# Patient Record
Sex: FEMALE | Race: WHITE | NOT HISPANIC OR LATINO
[De-identification: names, ages, dates, MRNs, and addresses within clinical notes are randomized per-mention and may not be internally consistent; named-entity substitution may affect disease eponyms.]

---

## 2019-02-21 ENCOUNTER — RECORD ABSTRACTING (OUTPATIENT)
Age: 74
End: 2019-02-21

## 2019-02-21 DIAGNOSIS — Z80.0 FAMILY HISTORY OF MALIGNANT NEOPLASM OF DIGESTIVE ORGANS: ICD-10-CM

## 2019-02-21 DIAGNOSIS — Z85.3 PERSONAL HISTORY OF MALIGNANT NEOPLASM OF BREAST: ICD-10-CM

## 2019-02-21 DIAGNOSIS — Z86.69 PERSONAL HISTORY OF OTHER DISEASES OF THE NERVOUS SYSTEM AND SENSE ORGANS: ICD-10-CM

## 2019-02-22 ENCOUNTER — APPOINTMENT (OUTPATIENT)
Dept: INTERNAL MEDICINE | Facility: CLINIC | Age: 74
End: 2019-02-22
Payer: MEDICARE

## 2019-02-22 VITALS
OXYGEN SATURATION: 98 % | SYSTOLIC BLOOD PRESSURE: 126 MMHG | DIASTOLIC BLOOD PRESSURE: 80 MMHG | HEART RATE: 83 BPM | WEIGHT: 150 LBS | TEMPERATURE: 98.2 F | HEIGHT: 66 IN | RESPIRATION RATE: 16 BRPM | BODY MASS INDEX: 24.11 KG/M2

## 2019-02-22 DIAGNOSIS — Z82.49 FAMILY HISTORY OF ISCHEMIC HEART DISEASE AND OTHER DISEASES OF THE CIRCULATORY SYSTEM: ICD-10-CM

## 2019-02-22 DIAGNOSIS — R30.0 DYSURIA: ICD-10-CM

## 2019-02-22 DIAGNOSIS — Z80.8 FAMILY HISTORY OF MALIGNANT NEOPLASM OF OTHER ORGANS OR SYSTEMS: ICD-10-CM

## 2019-02-22 PROCEDURE — 99214 OFFICE O/P EST MOD 30 MIN: CPT

## 2019-02-22 NOTE — PLAN
[FreeTextEntry1] : Neurology- multiple sclerosis- continue with Lyrica one tablet 3 times a day. Advised to follow with neurologist.  Continue all medications. \par \par Rheumatology- osteoporosis-  patient is currently taking Prolia injection every 6 months.  Advised to have a good social calcium daily. Decrease caffeine intake. Will monitor bone density scans.\par \par Orthopedic- acute left hip pain.  Patient is able to bear full weight and walk.  Send patient for an x-ray of the left hip and pelvis.  Start meloxicam 7.5 mg daily. Discontinue Tylenol. Followup with orthopedic.\par \par History of urinary tract infections-  patient currently taking cranberry tabs twice a day which seems to help

## 2019-02-22 NOTE — HEALTH RISK ASSESSMENT
[0] : 2) Feeling down, depressed, or hopeless: Not at all (0) [] : No [de-identified] : regular [de-identified] : multiple falls no injuries

## 2019-02-22 NOTE — PAST MEDICAL HISTORY
[Postmenopausal] : history of menopause having occurred [Menopause Age____] : age at menopause was [unfilled] [Total Preg ___] : G: [unfilled] [Live Births___] : P: [unfilled] [Full Term ___] : Full Term: [unfilled]

## 2019-02-22 NOTE — COUNSELING
[Fall prevention counseling provided] : fall prevention  [Adequate lighting] : Adequate lighting [No throw rugs] : No throw rugs [Use proper foot wear] : Use proper foot wear [Use recommended devices] : Use recommended devices [Patient motivation] : Patient motivation [Good understanding] : Patient has a good understanding of lifestyle changes and the steps needed to achieve self management goals

## 2019-02-22 NOTE — REVIEW OF SYSTEMS
[Negative] : Heme/Lymph [Back Pain] : back pain [FreeTextEntry9] : left hip pain   [de-identified] : black and blue spot left hip

## 2019-02-22 NOTE — HISTORY OF PRESENT ILLNESS
[Musculoskeletal Symptoms Legs] : leg [Musculoskeletal Symptoms Hips] : hip [___ Weeks ago] : [unfilled] weeks ago [Constant] : constant [Moderate] : moderate [OTC Remedies] : OTC remedies [Worsening] : worsening [FreeTextEntry7] : left hip [FreeTextEntry2] : pain goes down the leg [FreeTextEntry5] : Tylenol, [FreeTextEntry4] : sitting [FreeTextEntry8] : Patient states she is taking Tylenol 5 times a day without any relief.\par The patient denies trauma, or falling. But states the other day she saw her a black and blue spot on the hip , which is no longer there\par Patient denies pain with walking.\par \par Patient states she needs a renewal of her Lyrica. Denies any side effects of medication. Patient states the medication is not helping her left hip/leg pain.\par \par Patient denies using a walker or cane as directed mant times in the past

## 2019-03-25 ENCOUNTER — RX RENEWAL (OUTPATIENT)
Age: 74
End: 2019-03-25

## 2019-04-12 ENCOUNTER — RX RENEWAL (OUTPATIENT)
Age: 74
End: 2019-04-12

## 2019-04-16 ENCOUNTER — APPOINTMENT (OUTPATIENT)
Dept: INTERNAL MEDICINE | Facility: CLINIC | Age: 74
End: 2019-04-16

## 2019-04-23 ENCOUNTER — RX RENEWAL (OUTPATIENT)
Age: 74
End: 2019-04-23

## 2019-04-30 ENCOUNTER — APPOINTMENT (OUTPATIENT)
Dept: INTERNAL MEDICINE | Facility: CLINIC | Age: 74
End: 2019-04-30
Payer: MEDICARE

## 2019-04-30 VITALS
BODY MASS INDEX: 24.75 KG/M2 | HEIGHT: 66 IN | WEIGHT: 154 LBS | SYSTOLIC BLOOD PRESSURE: 88 MMHG | HEART RATE: 76 BPM | DIASTOLIC BLOOD PRESSURE: 66 MMHG | TEMPERATURE: 98.4 F | OXYGEN SATURATION: 97 % | RESPIRATION RATE: 16 BRPM

## 2019-04-30 PROCEDURE — 36415 COLL VENOUS BLD VENIPUNCTURE: CPT

## 2019-04-30 PROCEDURE — 99213 OFFICE O/P EST LOW 20 MIN: CPT | Mod: 25

## 2019-04-30 NOTE — HISTORY OF PRESENT ILLNESS
[FreeTextEntry1] : non-fasting blood work and general follow-up\par  [de-identified] : Patient is a 73 year year old female with a past medical history as below who presents for non-fasting blood work and general follow-up. Patient states she is taking all medications as prescribed and denies any adverse reactions or side effects. Patient inquires about measles vaccine.

## 2019-04-30 NOTE — ADDENDUM
[FreeTextEntry1] : I, Mark Euceda, acted solely as scribe for Dr. Emerson Smith DO on this date 04/30/2019  3:40PM .\par \par All medical record entries made by the Scribe were at my, Dr. Emerson Smith DO direction and personally dictated by me on 04/30/2019  3:40PM. I have reviewed the chart and agree that the record accurately reflects my personal performance of the history, physical exam, assessment and plan. I have also personally directed, reviewed and agreed with the chart.\par

## 2019-04-30 NOTE — PLAN
[FreeTextEntry1] : Musculoskeletal \par osteoarthritis-continue Meloxicam 7.5mg p.o.q.d. with food\par osteoporosis-continue Prolia 60mg/mL subcutaneous injections every 6 months\par continue Lyrica 75mg TID p.o.q.d.\par \par check immunization panel \par \par \par \par

## 2019-05-06 LAB
HBV SURFACE AB SER QL: NONREACTIVE
HEPATITIS A IGG ANTIBODY: NONREACTIVE
MEV IGG FLD QL IA: >300 AU/ML
MEV IGG+IGM SER-IMP: POSITIVE
MEV IGM SER QL: NEGATIVE
MUV IGM SER QL IA: <0.8 AU
RUBV IGG FLD-ACNC: >33 INDEX
RUBV IGG SER-IMP: POSITIVE
RUBV IGM FLD-ACNC: <20 AU/ML
VZV AB TITR SER: POSITIVE
VZV IGG SER IF-ACNC: 1284 INDEX
VZV IGM SER IF-ACNC: <0.91 INDEX

## 2019-05-22 ENCOUNTER — RX RENEWAL (OUTPATIENT)
Age: 74
End: 2019-05-22

## 2019-05-23 ENCOUNTER — RX RENEWAL (OUTPATIENT)
Age: 74
End: 2019-05-23

## 2019-05-28 ENCOUNTER — APPOINTMENT (OUTPATIENT)
Dept: INTERNAL MEDICINE | Facility: CLINIC | Age: 74
End: 2019-05-28
Payer: MEDICARE

## 2019-05-28 VITALS
WEIGHT: 153 LBS | TEMPERATURE: 98.4 F | BODY MASS INDEX: 24.59 KG/M2 | HEIGHT: 66 IN | RESPIRATION RATE: 14 BRPM | OXYGEN SATURATION: 98 % | HEART RATE: 77 BPM

## 2019-05-28 PROCEDURE — 99213 OFFICE O/P EST LOW 20 MIN: CPT

## 2019-05-28 NOTE — ADDENDUM
[FreeTextEntry1] : I, Mark Euceda, acted solely as scribe for Dr. Emerson Smith DO on this date 05/28/2019  4:20PM .\par \par All medical record entries made by the Scribe were at my, Dr. Emerson Smith DO direction and personally dictated by me on 05/28/2019  4:20PM. I have reviewed the chart and agree that the record accurately reflects my personal performance of the history, physical exam, assessment and plan. I have also personally directed, reviewed and agreed with the chart.\par

## 2019-05-28 NOTE — REVIEW OF SYSTEMS
[Joint Pain] : joint pain [Back Pain] : back pain [Negative] : Heme/Lymph [FreeTextEntry9] : neck pain

## 2019-05-28 NOTE — ASSESSMENT
[FreeTextEntry1] : Patient is a 73 year year old female with a past medical history as above who presents for Rx refill and general follow-up.\par

## 2019-05-28 NOTE — PHYSICAL EXAM
[Well Nourished] : well nourished [No Acute Distress] : no acute distress [Normal Sclera/Conjunctiva] : normal sclera/conjunctiva [Well-Appearing] : well-appearing [Well Developed] : well developed [PERRL] : pupils equal round and reactive to light [EOMI] : extraocular movements intact [No JVD] : no jugular venous distention [Normal Oropharynx] : the oropharynx was normal [Normal Outer Ear/Nose] : the outer ears and nose were normal in appearance [Supple] : supple [No Lymphadenopathy] : no lymphadenopathy [No Respiratory Distress] : no respiratory distress  [Thyroid Normal, No Nodules] : the thyroid was normal and there were no nodules present [Clear to Auscultation] : lungs were clear to auscultation bilaterally [No Accessory Muscle Use] : no accessory muscle use [Normal Rate] : normal rate  [Regular Rhythm] : with a regular rhythm [Normal S1, S2] : normal S1 and S2 [No Murmur] : no murmur heard [No Carotid Bruits] : no carotid bruits [No Abdominal Bruit] : a ~M bruit was not heard ~T in the abdomen [No Varicosities] : no varicosities [Pedal Pulses Present] : the pedal pulses are present [No Edema] : there was no peripheral edema [No Extremity Clubbing/Cyanosis] : no extremity clubbing/cyanosis [No Palpable Aorta] : no palpable aorta [Soft] : abdomen soft [Non Tender] : non-tender [Non-distended] : non-distended [No Masses] : no abdominal mass palpated [No HSM] : no HSM [Normal Bowel Sounds] : normal bowel sounds [Normal Anterior Cervical Nodes] : no anterior cervical lymphadenopathy [Normal Posterior Cervical Nodes] : no posterior cervical lymphadenopathy [No CVA Tenderness] : no CVA  tenderness [No Joint Swelling] : no joint swelling [No Spinal Tenderness] : no spinal tenderness [Grossly Normal Strength/Tone] : grossly normal strength/tone [No Rash] : no rash [Coordination Grossly Intact] : coordination grossly intact [Normal Gait] : normal gait [Deep Tendon Reflexes (DTR)] : deep tendon reflexes were 2+ and symmetric [Normal Affect] : the affect was normal [No Focal Deficits] : no focal deficits [Normal Insight/Judgement] : insight and judgment were intact

## 2019-05-28 NOTE — PLAN
[FreeTextEntry1] : Musculoskeletal \par osteoarthritis-continue Meloxicam 7.5mg p.o.q.d. with food\par osteoporosis-continue Prolia 60mg/mL subcutaneous injections every 6 months\par chronic back/neck pain - continue Lyrica 75mg TID p.o.q.d., Rx filled\par continue Oxycodone-Acetaminophhen 10-325mg QID p.o.q.d. as needed for pain \par \par \par \par \par

## 2019-05-28 NOTE — HISTORY OF PRESENT ILLNESS
[de-identified] : Patient is a 73 year year old female with a past medical history as below who presents for Rx refill and general follow-up. Patient states she is taking all medications as prescribed and denies any adverse reactions or side effects. Patient requests Rx refill for Lyrica given history of chronic back/neck pain secondary to spinal stenosis.  [FreeTextEntry1] : Rx refill and general follow-up\par

## 2019-06-28 ENCOUNTER — APPOINTMENT (OUTPATIENT)
Dept: INTERNAL MEDICINE | Facility: CLINIC | Age: 74
End: 2019-06-28

## 2019-07-15 ENCOUNTER — APPOINTMENT (OUTPATIENT)
Dept: INTERNAL MEDICINE | Facility: CLINIC | Age: 74
End: 2019-07-15
Payer: MEDICARE

## 2019-07-15 VITALS
HEIGHT: 68 IN | HEART RATE: 88 BPM | BODY MASS INDEX: 23.72 KG/M2 | TEMPERATURE: 98.6 F | SYSTOLIC BLOOD PRESSURE: 110 MMHG | DIASTOLIC BLOOD PRESSURE: 70 MMHG | RESPIRATION RATE: 18 BRPM | WEIGHT: 156.5 LBS | OXYGEN SATURATION: 97 %

## 2019-07-15 PROCEDURE — 99213 OFFICE O/P EST LOW 20 MIN: CPT

## 2019-07-16 NOTE — PLAN
[FreeTextEntry1] : Musculoskeletal \par osteoarthritis - continue Meloxicam 7.5mg p.o.q.d. with food\par osteoporosis - continue Prolia 60mg/mL subcutaneous injections every 6 months\par chronic back/neck pain - continue Lyrica 75mg TID p.o.q.d, Rx filled; continue Oxycodone-Acetaminophen 10-325mg QID p.o. p.r.n. for pain \par \par \par \par \par

## 2019-07-16 NOTE — ADDENDUM
[FreeTextEntry1] : I, Mark Euceda, acted solely as scribe for Dr. Emerson Smith DO on this date 07/15/2019 10:00AM .\par \par All medical record entries made by the Scribe were at my, Dr. Emerson Smith DO direction and personally dictated by me on 07/15/2019 10:00AM. I have reviewed the chart and agree that the record accurately reflects my personal performance of the history, physical exam, assessment and plan. I have also personally directed, reviewed and agreed with the chart.\par

## 2019-07-16 NOTE — HISTORY OF PRESENT ILLNESS
[FreeTextEntry1] : Rx refill and general follow-up\par  [de-identified] : Patient is a 73 year year old female with a past medical history as below who presents for Rx refill and general follow-up. Patient states she is taking all medications as prescribed and denies any adverse reactions or side effects. Patient requests Rx refill for Lyrica given history of chronic back/neck pain secondary to spinal stenosis.

## 2019-08-22 ENCOUNTER — APPOINTMENT (OUTPATIENT)
Dept: INTERNAL MEDICINE | Facility: CLINIC | Age: 74
End: 2019-08-22
Payer: MEDICARE

## 2019-08-22 VITALS
WEIGHT: 158.25 LBS | RESPIRATION RATE: 16 BRPM | HEIGHT: 68 IN | DIASTOLIC BLOOD PRESSURE: 64 MMHG | SYSTOLIC BLOOD PRESSURE: 92 MMHG | BODY MASS INDEX: 23.98 KG/M2 | HEART RATE: 94 BPM | TEMPERATURE: 98.2 F | OXYGEN SATURATION: 96 %

## 2019-08-22 PROCEDURE — 99213 OFFICE O/P EST LOW 20 MIN: CPT | Mod: 25

## 2019-08-22 PROCEDURE — 90670 PCV13 VACCINE IM: CPT

## 2019-08-22 PROCEDURE — G0009: CPT

## 2019-08-22 NOTE — PHYSICAL EXAM
[No Acute Distress] : no acute distress [Well Nourished] : well nourished [Well-Appearing] : well-appearing [Well Developed] : well developed [Normal Sclera/Conjunctiva] : normal sclera/conjunctiva [PERRL] : pupils equal round and reactive to light [EOMI] : extraocular movements intact [Normal Outer Ear/Nose] : the outer ears and nose were normal in appearance [Normal Oropharynx] : the oropharynx was normal [No JVD] : no jugular venous distention [No Lymphadenopathy] : no lymphadenopathy [Supple] : supple [Thyroid Normal, No Nodules] : the thyroid was normal and there were no nodules present [No Respiratory Distress] : no respiratory distress  [No Accessory Muscle Use] : no accessory muscle use [Clear to Auscultation] : lungs were clear to auscultation bilaterally [Normal Rate] : normal rate  [Regular Rhythm] : with a regular rhythm [Normal S1, S2] : normal S1 and S2 [No Murmur] : no murmur heard [No Carotid Bruits] : no carotid bruits [No Abdominal Bruit] : a ~M bruit was not heard ~T in the abdomen [No Varicosities] : no varicosities [Pedal Pulses Present] : the pedal pulses are present [No Edema] : there was no peripheral edema [No Palpable Aorta] : no palpable aorta [No Extremity Clubbing/Cyanosis] : no extremity clubbing/cyanosis [Soft] : abdomen soft [Non Tender] : non-tender [Non-distended] : non-distended [No Masses] : no abdominal mass palpated [No HSM] : no HSM [Normal Bowel Sounds] : normal bowel sounds [Normal Posterior Cervical Nodes] : no posterior cervical lymphadenopathy [Normal Anterior Cervical Nodes] : no anterior cervical lymphadenopathy [No CVA Tenderness] : no CVA  tenderness [No Spinal Tenderness] : no spinal tenderness [No Joint Swelling] : no joint swelling [Grossly Normal Strength/Tone] : grossly normal strength/tone [No Rash] : no rash [Coordination Grossly Intact] : coordination grossly intact [No Focal Deficits] : no focal deficits [Normal Gait] : normal gait [Deep Tendon Reflexes (DTR)] : deep tendon reflexes were 2+ and symmetric [Normal Affect] : the affect was normal [Normal Insight/Judgement] : insight and judgment were intact

## 2019-08-23 ENCOUNTER — RX RENEWAL (OUTPATIENT)
Age: 74
End: 2019-08-23

## 2019-08-23 NOTE — ADDENDUM
[FreeTextEntry1] : I, Mark Euceda, acted solely as scribe for Dr. Emerson Smith DO on this date 08/22/2019 10:50AM .\par \par All medical record entries made by the Scribe were at my, Dr. Emerson Smith DO direction and personally dictated by me on 08/22/2019 10:50AM. I have reviewed the chart and agree that the record accurately reflects my personal performance of the history, physical exam, assessment and plan. I have also personally directed, reviewed and agreed with the chart.\par

## 2019-08-23 NOTE — PLAN
[FreeTextEntry1] : Infectious Disease\par Prevnar 13 - 0.5mL x 1 administered intramuscularly \par Musculoskeletal \par osteoporosis - continue Prolia 60mg/mL subcutaneous injections every 6 months\par chronic back/neck pain - secondary to spinal stenosis - continue Lyrica 75mg TID p.o.q.d

## 2019-08-23 NOTE — HISTORY OF PRESENT ILLNESS
[FreeTextEntry1] : Prevnar 13  [de-identified] : Patient is a 74 year old female with a past medical history as below who presents for Prevnar 13. Patient states she is taking all medications as prescribed and denies any adverse reactions or side effects. Patient is currently taking Lyrica for chronic back/neck pain secondary to spinal stenosis. She inquires about the date of her next Prolia injection.

## 2019-08-26 ENCOUNTER — RX RENEWAL (OUTPATIENT)
Age: 74
End: 2019-08-26

## 2019-09-05 ENCOUNTER — APPOINTMENT (OUTPATIENT)
Dept: INTERNAL MEDICINE | Facility: CLINIC | Age: 74
End: 2019-09-05
Payer: MEDICARE

## 2019-09-05 VITALS
SYSTOLIC BLOOD PRESSURE: 98 MMHG | HEIGHT: 72 IN | RESPIRATION RATE: 16 BRPM | TEMPERATURE: 97.7 F | OXYGEN SATURATION: 96 % | HEART RATE: 86 BPM | BODY MASS INDEX: 20.99 KG/M2 | WEIGHT: 155 LBS | DIASTOLIC BLOOD PRESSURE: 68 MMHG

## 2019-09-05 DIAGNOSIS — Z86.69 PERSONAL HISTORY OF OTHER DISEASES OF THE NERVOUS SYSTEM AND SENSE ORGANS: ICD-10-CM

## 2019-09-05 PROCEDURE — 36415 COLL VENOUS BLD VENIPUNCTURE: CPT

## 2019-09-05 PROCEDURE — 99213 OFFICE O/P EST LOW 20 MIN: CPT | Mod: 25

## 2019-09-05 NOTE — REVIEW OF SYSTEMS
[Back Pain] : back pain [Negative] : Heme/Lymph [FreeTextEntry9] : left hip pain   [de-identified] : black and blue spot left hip

## 2019-09-05 NOTE — PLAN
[FreeTextEntry1] : Neurology- multiple sclerosis-Lumbar stenosis  continue with Lyrica one tablet 3 times a day. Advised to follow with neurologist.  Continue all medications.  Checked \par \par Rheumatology- osteoporosis-  patient is currently taking Prolia injection every 6 months.  Advised to have a good social calcium daily. Decrease caffeine intake. Will monitor bone density scans. Check labs if normal will give prolia injection next week \par \par History of urinary tract infections-  patient currently taking cranberry tabs twice a day which seems to help.\par \par Immunization advised to follow up in 10/19 for the flu shot.  \par refused Tdap today

## 2019-09-05 NOTE — PHYSICAL EXAM
[No Acute Distress] : no acute distress [Well Nourished] : well nourished [Well Developed] : well developed [Well-Appearing] : well-appearing [Normal Sclera/Conjunctiva] : normal sclera/conjunctiva [PERRL] : pupils equal round and reactive to light [EOMI] : extraocular movements intact [Normal Outer Ear/Nose] : the outer ears and nose were normal in appearance [Normal Oropharynx] : the oropharynx was normal [No JVD] : no jugular venous distention [Supple] : supple [No Lymphadenopathy] : no lymphadenopathy [Thyroid Normal, No Nodules] : the thyroid was normal and there were no nodules present [No Respiratory Distress] : no respiratory distress  [Clear to Auscultation] : lungs were clear to auscultation bilaterally [No Accessory Muscle Use] : no accessory muscle use [Regular Rhythm] : with a regular rhythm [Normal Rate] : normal rate  [Normal S1, S2] : normal S1 and S2 [No Murmur] : no murmur heard [No Carotid Bruits] : no carotid bruits [No Abdominal Bruit] : a ~M bruit was not heard ~T in the abdomen [Pedal Pulses Present] : the pedal pulses are present [No Varicosities] : no varicosities [No Edema] : there was no peripheral edema [No Palpable Aorta] : no palpable aorta [No Extremity Clubbing/Cyanosis] : no extremity clubbing/cyanosis [Non Tender] : non-tender [Soft] : abdomen soft [Non-distended] : non-distended [No Masses] : no abdominal mass palpated [No HSM] : no HSM [Normal Bowel Sounds] : normal bowel sounds [Normal Posterior Cervical Nodes] : no posterior cervical lymphadenopathy [Normal Anterior Cervical Nodes] : no anterior cervical lymphadenopathy [No CVA Tenderness] : no CVA  tenderness [No Spinal Tenderness] : no spinal tenderness [No Joint Swelling] : no joint swelling [Grossly Normal Strength/Tone] : grossly normal strength/tone [No Rash] : no rash [Normal Gait] : normal gait [Coordination Grossly Intact] : coordination grossly intact [No Focal Deficits] : no focal deficits [Deep Tendon Reflexes (DTR)] : deep tendon reflexes were 2+ and symmetric [Normal Affect] : the affect was normal [Normal Insight/Judgement] : insight and judgment were intact

## 2019-09-05 NOTE — HEALTH RISK ASSESSMENT
[0] : 2) Feeling down, depressed, or hopeless: Not at all (0) [Never (0 pts)] : Never (0 points) [No] : In the past 12 months have you used drugs other than those required for medical reasons? No [No falls in past year] : Patient reported no falls in the past year [Patient reported bone density results were abnormal] : Patient reported bone density results were abnormal [With Significant Other] : lives with significant other [# of Members in Household ___] :  household currently consist of [unfilled] member(s) [Employed] : employed [] :  [Fully functional (bathing, dressing, toileting, transferring, walking, feeding)] : Fully functional (bathing, dressing, toileting, transferring, walking, feeding) [Fully functional (using the telephone, shopping, preparing meals, housekeeping, doing laundry, using] : Fully functional and needs no help or supervision to perform IADLs (using the telephone, shopping, preparing meals, housekeeping, doing laundry, using transportation, managing medications and managing finances) [Reports changes in hearing] : Reports changes in hearing [] : No [de-identified] : regular [de-identified] : rxn lyrica  [Audit-CScore] : 0 [OGO9Ocbrt] : 0 [de-identified] : multiple falls no injuries  [BoneDensityDate] : 01/19 [BoneDensityComments] : osteoporosis  [de-identified] : hearing aids

## 2019-09-05 NOTE — ASSESSMENT
[FreeTextEntry1] : A 74-year-old female, who presents to office for follow up of her multiple sclerosis and osteoporosis

## 2019-09-05 NOTE — COUNSELING
[Use recommended devices] : Use recommended devices [Patient motivation] : Patient motivation [Good understanding] : Patient has a good understanding of lifestyle changes and the steps needed to achieve self management goals [Fall prevention counseling provided] : Fall prevention counseling provided [Adequate lighting] : Adequate lighting [No throw rugs] : No throw rugs [Use proper foot wear] : Use proper foot wear [AUDIT-C Screening administered and reviewed] : AUDIT-C Screening administered and reviewed

## 2019-09-05 NOTE — HISTORY OF PRESENT ILLNESS
[FreeTextEntry1] : check up and Medication renewal  [de-identified] : A 74-year-old female, with a past medical history as listed below, presents to the office for a checkup and renewal of medication. Patient states she's been taking Lyrica her for her multiple sclerosis/neuropathy symptoms which seems to be helping control her pain and numbness.  Ms. Lagunas denies any side effects to the current medication regimen.  Patient denies any recent falls. Also states she has not fallen down over the past year.\par \corby Also states that her Prolia shot is ready at [facility]. States she is to  the medication to bring to the office.\par \corby Recently saw her a new ophthalmologist who diagnosed her with allergic conjunctivitis and placed on antihistamine drops.  \par \par

## 2019-09-06 LAB
25(OH)D3 SERPL-MCNC: 32.3 NG/ML
ALBUMIN SERPL ELPH-MCNC: 4.6 G/DL
ALP BLD-CCNC: 62 U/L
ALT SERPL-CCNC: 12 U/L
ANION GAP SERPL CALC-SCNC: 12 MMOL/L
AST SERPL-CCNC: 14 U/L
BASOPHILS # BLD AUTO: 0.03 K/UL
BASOPHILS NFR BLD AUTO: 0.4 %
BILIRUB SERPL-MCNC: 0.3 MG/DL
BUN SERPL-MCNC: 20 MG/DL
CALCIUM SERPL-MCNC: 10.5 MG/DL
CHLORIDE SERPL-SCNC: 102 MMOL/L
CO2 SERPL-SCNC: 26 MMOL/L
CREAT SERPL-MCNC: 0.86 MG/DL
EOSINOPHIL # BLD AUTO: 0.17 K/UL
EOSINOPHIL NFR BLD AUTO: 2.2 %
GLUCOSE SERPL-MCNC: 101 MG/DL
HCT VFR BLD CALC: 45.3 %
HGB BLD-MCNC: 14.4 G/DL
IMM GRANULOCYTES NFR BLD AUTO: 0.4 %
LYMPHOCYTES # BLD AUTO: 1.77 K/UL
LYMPHOCYTES NFR BLD AUTO: 22.6 %
MAN DIFF?: NORMAL
MCHC RBC-ENTMCNC: 29.3 PG
MCHC RBC-ENTMCNC: 31.8 GM/DL
MCV RBC AUTO: 92.3 FL
MONOCYTES # BLD AUTO: 0.68 K/UL
MONOCYTES NFR BLD AUTO: 8.7 %
NEUTROPHILS # BLD AUTO: 5.16 K/UL
NEUTROPHILS NFR BLD AUTO: 65.7 %
PLATELET # BLD AUTO: 243 K/UL
POTASSIUM SERPL-SCNC: 5.2 MMOL/L
PROT SERPL-MCNC: 7.4 G/DL
RBC # BLD: 4.91 M/UL
RBC # FLD: 13.6 %
SODIUM SERPL-SCNC: 140 MMOL/L
WBC # FLD AUTO: 7.84 K/UL

## 2019-09-09 ENCOUNTER — APPOINTMENT (OUTPATIENT)
Dept: INTERNAL MEDICINE | Facility: CLINIC | Age: 74
End: 2019-09-09

## 2019-09-13 ENCOUNTER — MED ADMIN CHARGE (OUTPATIENT)
Age: 74
End: 2019-09-13

## 2019-09-13 ENCOUNTER — APPOINTMENT (OUTPATIENT)
Dept: INTERNAL MEDICINE | Facility: CLINIC | Age: 74
End: 2019-09-13
Payer: MEDICARE

## 2019-09-13 VITALS
HEART RATE: 85 BPM | HEIGHT: 66 IN | OXYGEN SATURATION: 98 % | RESPIRATION RATE: 16 BRPM | SYSTOLIC BLOOD PRESSURE: 100 MMHG | DIASTOLIC BLOOD PRESSURE: 72 MMHG | TEMPERATURE: 98.4 F

## 2019-09-13 PROCEDURE — 96372 THER/PROPH/DIAG INJ SC/IM: CPT | Mod: 59

## 2019-09-13 PROCEDURE — 86580 TB INTRADERMAL TEST: CPT

## 2019-09-13 PROCEDURE — 99214 OFFICE O/P EST MOD 30 MIN: CPT | Mod: 25

## 2019-09-13 NOTE — PLAN
[FreeTextEntry1] : Neurology- multiple sclerosis-Lumbar stenosis  continue with Lyrica one tablet 3 times a day. Advised to follow with neurologist.  Continue all medications.  Checked \par \par Rheumatology- osteoporosis- Ca level normal. Prolia given sub Q left upper ext.  Lot [id number]   exp 1021.  Advised to have a good source of  calcium daily. Decrease caffeine intake. Will monitor bone density scans. \par History of urinary tract infections-  patient currently taking cranberry tabs twice a day which seems to help.\par \par Immunization advised to follow up in 10/19 for the flu shot.  \par refused Tdap today \par PPD check in 48- 72 hours\par See medical form for work

## 2019-09-13 NOTE — PAST MEDICAL HISTORY
[Postmenopausal] : history of menopause having occurred [Menopause Age____] : age at menopause was [unfilled] [Live Births___] : P: [unfilled] [Total Preg ___] : G: [unfilled] [Full Term ___] : Full Term: [unfilled]

## 2019-09-13 NOTE — REVIEW OF SYSTEMS
[Back Pain] : back pain [Negative] : Heme/Lymph [FreeTextEntry9] : left hip pain   [de-identified] : black and blue spot left hip

## 2019-09-13 NOTE — HEALTH RISK ASSESSMENT
[Never (0 pts)] : Never (0 points) [No] : In the past 12 months have you used drugs other than those required for medical reasons? No [No falls in past year] : Patient reported no falls in the past year [0] : 2) Feeling down, depressed, or hopeless: Not at all (0) [Patient reported bone density results were abnormal] : Patient reported bone density results were abnormal [With Significant Other] : lives with significant other [Employed] : employed [# of Members in Household ___] :  household currently consist of [unfilled] member(s) [] :  [Fully functional (bathing, dressing, toileting, transferring, walking, feeding)] : Fully functional (bathing, dressing, toileting, transferring, walking, feeding) [Fully functional (using the telephone, shopping, preparing meals, housekeeping, doing laundry, using] : Fully functional and needs no help or supervision to perform IADLs (using the telephone, shopping, preparing meals, housekeeping, doing laundry, using transportation, managing medications and managing finances) [Reports changes in hearing] : Reports changes in hearing [] : No [de-identified] : rxn lyrica  [Audit-CScore] : 0 [de-identified] : regular [de-identified] : multiple falls no injuries  [ESB5Yifmw] : 0 [BoneDensityComments] : osteoporosis  [BoneDensityDate] : 01/19 [de-identified] : hearing aids

## 2019-09-13 NOTE — PHYSICAL EXAM
[No Acute Distress] : no acute distress [Well Nourished] : well nourished [Well-Appearing] : well-appearing [Well Developed] : well developed [Normal Sclera/Conjunctiva] : normal sclera/conjunctiva [PERRL] : pupils equal round and reactive to light [EOMI] : extraocular movements intact [Normal Outer Ear/Nose] : the outer ears and nose were normal in appearance [Normal Oropharynx] : the oropharynx was normal [No JVD] : no jugular venous distention [Supple] : supple [No Lymphadenopathy] : no lymphadenopathy [Thyroid Normal, No Nodules] : the thyroid was normal and there were no nodules present [No Respiratory Distress] : no respiratory distress  [Clear to Auscultation] : lungs were clear to auscultation bilaterally [No Accessory Muscle Use] : no accessory muscle use [Regular Rhythm] : with a regular rhythm [Normal Rate] : normal rate  [Normal S1, S2] : normal S1 and S2 [No Abdominal Bruit] : a ~M bruit was not heard ~T in the abdomen [No Carotid Bruits] : no carotid bruits [Pedal Pulses Present] : the pedal pulses are present [No Edema] : there was no peripheral edema [No Extremity Clubbing/Cyanosis] : no extremity clubbing/cyanosis [No Palpable Aorta] : no palpable aorta [Soft] : abdomen soft [Non Tender] : non-tender [Non-distended] : non-distended [No Masses] : no abdominal mass palpated [No HSM] : no HSM [Normal Bowel Sounds] : normal bowel sounds [Normal Posterior Cervical Nodes] : no posterior cervical lymphadenopathy [Normal Anterior Cervical Nodes] : no anterior cervical lymphadenopathy [No CVA Tenderness] : no CVA  tenderness [No Spinal Tenderness] : no spinal tenderness [No Joint Swelling] : no joint swelling [Grossly Normal Strength/Tone] : grossly normal strength/tone [No Rash] : no rash [No Focal Deficits] : no focal deficits [Deep Tendon Reflexes (DTR)] : deep tendon reflexes were 2+ and symmetric [Normal Affect] : the affect was normal [Normal Insight/Judgement] : insight and judgment were intact [Alert and Oriented x3] : oriented to person, place, and time [Speech Grossly Normal] : speech grossly normal [Normal Mood] : the mood was normal [de-identified] : with murmur  [de-identified] : DJD changes hand  [de-identified] : gait unbalanced secondary to MS

## 2019-09-13 NOTE — COUNSELING
[AUDIT-C Screening administered and reviewed] : AUDIT-C Screening administered and reviewed [Fall prevention counseling provided] : fall prevention  [Adequate lighting] : Adequate lighting [No throw rugs] : No throw rugs [Use proper foot wear] : Use proper foot wear [Use recommended devices] : Use recommended devices [Patient motivation] : Patient motivation [Good understanding] : Patient has a good understanding of lifestyle changes and the steps needed to achieve self management goals

## 2019-09-13 NOTE — HISTORY OF PRESENT ILLNESS
[FreeTextEntry1] : Prolia shot and work clearance form  [de-identified] : A 74-year-old female, with a past medical history as noted below, presents to office for a checkup, Prolia injection and evaluation to work at a [employer].  Patient states she needs a form completed stating she is free of any communicable diseases. Patient denies having past  medical history of TB or exposure to tuberculosis.  Patient denies having fever chills night sweats, cough or hemoptysis

## 2019-10-02 ENCOUNTER — APPOINTMENT (OUTPATIENT)
Dept: INTERNAL MEDICINE | Facility: CLINIC | Age: 74
End: 2019-10-02
Payer: MEDICARE

## 2019-10-02 ENCOUNTER — MED ADMIN CHARGE (OUTPATIENT)
Age: 74
End: 2019-10-02

## 2019-10-02 VITALS
WEIGHT: 150 LBS | SYSTOLIC BLOOD PRESSURE: 102 MMHG | HEIGHT: 66 IN | RESPIRATION RATE: 16 BRPM | OXYGEN SATURATION: 98 % | HEART RATE: 97 BPM | DIASTOLIC BLOOD PRESSURE: 68 MMHG | BODY MASS INDEX: 24.11 KG/M2

## 2019-10-02 PROCEDURE — 90662 IIV NO PRSV INCREASED AG IM: CPT

## 2019-10-02 PROCEDURE — G0008: CPT

## 2019-10-02 PROCEDURE — 99213 OFFICE O/P EST LOW 20 MIN: CPT | Mod: 25

## 2019-10-02 NOTE — HISTORY OF PRESENT ILLNESS
[FreeTextEntry1] : Check up and flu shot  [de-identified] : A 74- year- old female, with a past medical history as noted below, presents to office for a checkup and a flu shot.  States she feels well.  Denies recent infections, SOB, MORALES, nausea or vomiting.  \par Would like to get a refill on Lyrica as well asa flu shot \par Denies recent falls

## 2019-10-02 NOTE — HEALTH RISK ASSESSMENT
[Never (0 pts)] : Never (0 points) [No] : In the past 12 months have you used drugs other than those required for medical reasons? No [No falls in past year] : Patient reported no falls in the past year [0] : 2) Feeling down, depressed, or hopeless: Not at all (0) [Patient reported bone density results were abnormal] : Patient reported bone density results were abnormal [With Significant Other] : lives with significant other [# of Members in Household ___] :  household currently consist of [unfilled] member(s) [Employed] : employed [] :  [Fully functional (bathing, dressing, toileting, transferring, walking, feeding)] : Fully functional (bathing, dressing, toileting, transferring, walking, feeding) [Fully functional (using the telephone, shopping, preparing meals, housekeeping, doing laundry, using] : Fully functional and needs no help or supervision to perform IADLs (using the telephone, shopping, preparing meals, housekeeping, doing laundry, using transportation, managing medications and managing finances) [Reports changes in hearing] : Reports changes in hearing [] : No [Audit-CScore] : 0 [de-identified] : rxn lyrica  [de-identified] : multiple falls no injuries  [de-identified] : regular [VNR1Ruema] : 0 [BoneDensityDate] : 01/19 [BoneDensityComments] : osteoporosis  [de-identified] : hearing aids

## 2019-10-02 NOTE — REVIEW OF SYSTEMS
[Back Pain] : back pain [Negative] : Psychiatric [FreeTextEntry9] : left hip pain   [de-identified] : black and blue spot left hip

## 2019-10-02 NOTE — PAST MEDICAL HISTORY
[Postmenopausal] : history of menopause having occurred [Menopause Age____] : age at menopause was [unfilled] [Total Preg ___] : G: [unfilled] [Full Term ___] : Full Term: [unfilled]  [Live Births___] : P: [unfilled]

## 2019-10-02 NOTE — PHYSICAL EXAM
[No Acute Distress] : no acute distress [Well Nourished] : well nourished [Well Developed] : well developed [Well-Appearing] : well-appearing [Normal Sclera/Conjunctiva] : normal sclera/conjunctiva [PERRL] : pupils equal round and reactive to light [EOMI] : extraocular movements intact [Normal Outer Ear/Nose] : the outer ears and nose were normal in appearance [No JVD] : no jugular venous distention [Normal Oropharynx] : the oropharynx was normal [Supple] : supple [No Lymphadenopathy] : no lymphadenopathy [Thyroid Normal, No Nodules] : the thyroid was normal and there were no nodules present [No Respiratory Distress] : no respiratory distress  [Clear to Auscultation] : lungs were clear to auscultation bilaterally [No Accessory Muscle Use] : no accessory muscle use [Normal Rate] : normal rate  [Regular Rhythm] : with a regular rhythm [Normal S1, S2] : normal S1 and S2 [No Carotid Bruits] : no carotid bruits [No Abdominal Bruit] : a ~M bruit was not heard ~T in the abdomen [Pedal Pulses Present] : the pedal pulses are present [No Edema] : there was no peripheral edema [No Extremity Clubbing/Cyanosis] : no extremity clubbing/cyanosis [No Palpable Aorta] : no palpable aorta [Soft] : abdomen soft [Non Tender] : non-tender [Non-distended] : non-distended [No Masses] : no abdominal mass palpated [No HSM] : no HSM [Normal Bowel Sounds] : normal bowel sounds [Normal Posterior Cervical Nodes] : no posterior cervical lymphadenopathy [Normal Anterior Cervical Nodes] : no anterior cervical lymphadenopathy [No CVA Tenderness] : no CVA  tenderness [No Spinal Tenderness] : no spinal tenderness [No Joint Swelling] : no joint swelling [Grossly Normal Strength/Tone] : grossly normal strength/tone [No Rash] : no rash [No Focal Deficits] : no focal deficits [Deep Tendon Reflexes (DTR)] : deep tendon reflexes were 2+ and symmetric [Speech Grossly Normal] : speech grossly normal [Normal Affect] : the affect was normal [Alert and Oriented x3] : oriented to person, place, and time [Normal Mood] : the mood was normal [Normal Insight/Judgement] : insight and judgment were intact [de-identified] : with murmur  [de-identified] : DJD changes hand  [de-identified] : gait unbalanced secondary to MS

## 2019-11-12 ENCOUNTER — RX RENEWAL (OUTPATIENT)
Age: 74
End: 2019-11-12

## 2019-11-13 ENCOUNTER — APPOINTMENT (OUTPATIENT)
Dept: INTERNAL MEDICINE | Facility: CLINIC | Age: 74
End: 2019-11-13
Payer: MEDICARE

## 2019-11-13 VITALS
HEART RATE: 98 BPM | HEIGHT: 66 IN | BODY MASS INDEX: 24.11 KG/M2 | OXYGEN SATURATION: 97 % | TEMPERATURE: 98.3 F | RESPIRATION RATE: 18 BRPM | DIASTOLIC BLOOD PRESSURE: 82 MMHG | WEIGHT: 150 LBS | SYSTOLIC BLOOD PRESSURE: 110 MMHG

## 2019-11-13 PROCEDURE — 99213 OFFICE O/P EST LOW 20 MIN: CPT

## 2019-11-13 NOTE — HISTORY OF PRESENT ILLNESS
[FreeTextEntry1] : Check up and  renewal of medication  [de-identified] : A 74- year- old female, with a past medical history as noted below, presents to office for a checkup  and renewal of medication.  States she feels well.  Denies recent infections, SOB, MORALES, nausea or vomiting.  \par  \par Denies recent falls.\par Unaware of last Tdap shot

## 2019-11-13 NOTE — PHYSICAL EXAM
[No Acute Distress] : no acute distress [Well-Appearing] : well-appearing [Well Nourished] : well nourished [Well Developed] : well developed [Normal Sclera/Conjunctiva] : normal sclera/conjunctiva [EOMI] : extraocular movements intact [PERRL] : pupils equal round and reactive to light [No JVD] : no jugular venous distention [Normal Outer Ear/Nose] : the outer ears and nose were normal in appearance [Normal Oropharynx] : the oropharynx was normal [No Lymphadenopathy] : no lymphadenopathy [Supple] : supple [Thyroid Normal, No Nodules] : the thyroid was normal and there were no nodules present [No Accessory Muscle Use] : no accessory muscle use [Clear to Auscultation] : lungs were clear to auscultation bilaterally [No Respiratory Distress] : no respiratory distress  [Regular Rhythm] : with a regular rhythm [Normal Rate] : normal rate  [No Abdominal Bruit] : a ~M bruit was not heard ~T in the abdomen [No Carotid Bruits] : no carotid bruits [Normal S1, S2] : normal S1 and S2 [No Edema] : there was no peripheral edema [Pedal Pulses Present] : the pedal pulses are present [No Extremity Clubbing/Cyanosis] : no extremity clubbing/cyanosis [No Palpable Aorta] : no palpable aorta [Non Tender] : non-tender [Soft] : abdomen soft [Non-distended] : non-distended [No Masses] : no abdominal mass palpated [No HSM] : no HSM [Normal Bowel Sounds] : normal bowel sounds [Normal Posterior Cervical Nodes] : no posterior cervical lymphadenopathy [No CVA Tenderness] : no CVA  tenderness [No Spinal Tenderness] : no spinal tenderness [Normal Anterior Cervical Nodes] : no anterior cervical lymphadenopathy [No Joint Swelling] : no joint swelling [No Rash] : no rash [Grossly Normal Strength/Tone] : grossly normal strength/tone [Speech Grossly Normal] : speech grossly normal [No Focal Deficits] : no focal deficits [Deep Tendon Reflexes (DTR)] : deep tendon reflexes were 2+ and symmetric [Alert and Oriented x3] : oriented to person, place, and time [Normal Affect] : the affect was normal [Normal Mood] : the mood was normal [Normal Insight/Judgement] : insight and judgment were intact [de-identified] : DJD changes hand  [de-identified] : with murmur  [de-identified] : gait unbalanced secondary to MS

## 2019-11-13 NOTE — PAST MEDICAL HISTORY
[Postmenopausal] : history of menopause having occurred [Total Preg ___] : G: [unfilled] [Menopause Age____] : age at menopause was [unfilled] [Full Term ___] : Full Term: [unfilled]  [Live Births___] : P: [unfilled]

## 2019-11-13 NOTE — COUNSELING
[AUDIT-C Screening administered and reviewed] : AUDIT-C Screening administered and reviewed [Fall prevention counseling provided] : fall prevention  [Adequate lighting] : Adequate lighting [No throw rugs] : No throw rugs [Use proper foot wear] : Use proper foot wear [Use recommended devices] : Use recommended devices [Patient motivation] : Patient motivation [de-identified] : Advised importance of using a cane  [Good understanding] : Patient has a good understanding of disease, goals and obesity follow-up plan

## 2019-11-13 NOTE — HEALTH RISK ASSESSMENT
[No] : In the past 12 months have you used drugs other than those required for medical reasons? No [Never (0 pts)] : Never (0 points) [No falls in past year] : Patient reported no falls in the past year [0] : 2) Feeling down, depressed, or hopeless: Not at all (0) [Patient reported bone density results were abnormal] : Patient reported bone density results were abnormal [With Significant Other] : lives with significant other [# of Members in Household ___] :  household currently consist of [unfilled] member(s) [Fully functional (bathing, dressing, toileting, transferring, walking, feeding)] : Fully functional (bathing, dressing, toileting, transferring, walking, feeding) [Employed] : employed [] :  [Fully functional (using the telephone, shopping, preparing meals, housekeeping, doing laundry, using] : Fully functional and needs no help or supervision to perform IADLs (using the telephone, shopping, preparing meals, housekeeping, doing laundry, using transportation, managing medications and managing finances) [Reports changes in hearing] : Reports changes in hearing [] : No [Audit-CScore] : 0 [de-identified] : regular [de-identified] : multiple falls no injuries  [de-identified] : rxn lyrica  [[id number]] : 0 [BoneDensityDate] : 01/19 [de-identified] : hearing aids  [BoneDensityComments] : osteoporosis

## 2019-11-13 NOTE — ASSESSMENT
[FreeTextEntry1] : A 74- year-old female, who presents to office for follow up of her multiple sclerosis and osteoporosis

## 2019-11-13 NOTE — PLAN
[FreeTextEntry1] : Neurology- multiple sclerosis-Lumbar stenosis  continue with Lyrica one tablet 3 times a day. Advised to follow with neurologist.  Continue all medications.  Checked  see scan.  Advised side effects to the medication \par \par Rheumatology- osteoporosis-   Advised to have a good source of  calcium daily. Decrease caffeine intake. Will monitor bone density scans.  Prolia injection was done 9/19 \par  \par Immunization - Advised to call medicare - about Tdap \par \par \par We discussed the importance of healthy lifestyles which include exercise, weight control and good diet.\par Patient's questions were answered in full detail. Advise patient if any other concerns arise to please call office to have a discussion.

## 2019-12-10 ENCOUNTER — APPOINTMENT (OUTPATIENT)
Dept: INTERNAL MEDICINE | Facility: CLINIC | Age: 74
End: 2019-12-10
Payer: MEDICARE

## 2019-12-10 VITALS
HEIGHT: 66 IN | OXYGEN SATURATION: 97 % | DIASTOLIC BLOOD PRESSURE: 70 MMHG | HEART RATE: 78 BPM | TEMPERATURE: 98.3 F | SYSTOLIC BLOOD PRESSURE: 102 MMHG | RESPIRATION RATE: 16 BRPM

## 2019-12-10 PROCEDURE — 99213 OFFICE O/P EST LOW 20 MIN: CPT

## 2019-12-10 NOTE — PHYSICAL EXAM
[No Acute Distress] : no acute distress [Well-Appearing] : well-appearing [Well Nourished] : well nourished [Well Developed] : well developed [PERRL] : pupils equal round and reactive to light [Normal Sclera/Conjunctiva] : normal sclera/conjunctiva [EOMI] : extraocular movements intact [Normal Outer Ear/Nose] : the outer ears and nose were normal in appearance [Normal Oropharynx] : the oropharynx was normal [No JVD] : no jugular venous distention [Supple] : supple [No Lymphadenopathy] : no lymphadenopathy [No Respiratory Distress] : no respiratory distress  [No Accessory Muscle Use] : no accessory muscle use [Thyroid Normal, No Nodules] : the thyroid was normal and there were no nodules present [Normal Rate] : normal rate  [Clear to Auscultation] : lungs were clear to auscultation bilaterally [Regular Rhythm] : with a regular rhythm [Normal S1, S2] : normal S1 and S2 [No Murmur] : no murmur heard [No Varicosities] : no varicosities [No Abdominal Bruit] : a ~M bruit was not heard ~T in the abdomen [No Carotid Bruits] : no carotid bruits [Pedal Pulses Present] : the pedal pulses are present [No Edema] : there was no peripheral edema [No Palpable Aorta] : no palpable aorta [Non Tender] : non-tender [No Extremity Clubbing/Cyanosis] : no extremity clubbing/cyanosis [Soft] : abdomen soft [No Masses] : no abdominal mass palpated [No HSM] : no HSM [Non-distended] : non-distended [Normal Anterior Cervical Nodes] : no anterior cervical lymphadenopathy [Normal Posterior Cervical Nodes] : no posterior cervical lymphadenopathy [Normal Bowel Sounds] : normal bowel sounds [No CVA Tenderness] : no CVA  tenderness [No Spinal Tenderness] : no spinal tenderness [Grossly Normal Strength/Tone] : grossly normal strength/tone [No Joint Swelling] : no joint swelling [No Rash] : no rash [No Focal Deficits] : no focal deficits [Coordination Grossly Intact] : coordination grossly intact [Normal Gait] : normal gait [Deep Tendon Reflexes (DTR)] : deep tendon reflexes were 2+ and symmetric [Normal Insight/Judgement] : insight and judgment were intact [Normal Affect] : the affect was normal

## 2019-12-11 NOTE — PLAN
[FreeTextEntry1] : Musculoskeletal \par osteoporosis - continue Prolia 60mg/mL subcutaneous injections every 6 months\par chronic back/neck pain - secondary to spinal stenosis - continue Lyrica (Pregabalin) 75mg TID p.o.q.d., Rx filled,  reviewed  \par Psychiatry\par continue Venlafaxine HCl 25mg TID p.o.q.d. as directed \par Urology\par chronic UTIs - continue Methenamine Hippurate 1 GM p.o.q.d. as directed and OTC Cranberry 405mg BID p.o.q.d.\par incontinence/overactive bladder - continue Trospium Chloride ER 60mg p.o.q.d.\par

## 2019-12-11 NOTE — HISTORY OF PRESENT ILLNESS
[Spouse] : spouse [FreeTextEntry1] : Rx refill and general follow-up [de-identified] : Patient is a 74 year old female with a past medical history as below who presents for Rx refill and general follow-up. Patient states she is taking all medications as prescribed and denies any adverse reactions or side effects. She notes less frequent UTIs since starting Methenamine Hippurate and OTC Cranberry capsules. Patient requests Rx refill for Lyrica which she states is helping with chronic back and neck pain.

## 2019-12-11 NOTE — ADDENDUM
[FreeTextEntry1] : I, Mark Euceda, acted solely as scribe for Dr. Emerson Smith DO on this date 12/10/2019  3:20PM .\par \par All medical record entries made by the Scribe were at my, Dr. Emerson Smith DO direction and personally dictated by me on 12/10/2019  3:20PM. I have reviewed the chart and agree that the record accurately reflects my personal performance of the history, physical exam, assessment and plan. I have also personally directed, reviewed and agreed with the chart.\par

## 2019-12-11 NOTE — ASSESSMENT
[FreeTextEntry1] : Patient is a 74 year old female with a past medical history as above who presents for Rx refill and general follow-up.

## 2020-01-06 ENCOUNTER — APPOINTMENT (OUTPATIENT)
Dept: INTERNAL MEDICINE | Facility: CLINIC | Age: 75
End: 2020-01-06

## 2020-01-13 ENCOUNTER — APPOINTMENT (OUTPATIENT)
Dept: INTERNAL MEDICINE | Facility: CLINIC | Age: 75
End: 2020-01-13
Payer: MEDICARE

## 2020-01-13 VITALS
RESPIRATION RATE: 16 BRPM | OXYGEN SATURATION: 96 % | HEIGHT: 66 IN | BODY MASS INDEX: 24.11 KG/M2 | DIASTOLIC BLOOD PRESSURE: 68 MMHG | SYSTOLIC BLOOD PRESSURE: 120 MMHG | WEIGHT: 150 LBS | HEART RATE: 83 BPM | TEMPERATURE: 97.6 F

## 2020-01-13 PROCEDURE — 99213 OFFICE O/P EST LOW 20 MIN: CPT

## 2020-01-13 NOTE — PHYSICAL EXAM
[No Acute Distress] : no acute distress [Well Nourished] : well nourished [Well Developed] : well developed [Well-Appearing] : well-appearing [Normal Sclera/Conjunctiva] : normal sclera/conjunctiva [PERRL] : pupils equal round and reactive to light [EOMI] : extraocular movements intact [Normal Outer Ear/Nose] : the outer ears and nose were normal in appearance [Normal Oropharynx] : the oropharynx was normal [No JVD] : no jugular venous distention [No Lymphadenopathy] : no lymphadenopathy [Supple] : supple [Thyroid Normal, No Nodules] : the thyroid was normal and there were no nodules present [No Respiratory Distress] : no respiratory distress  [No Accessory Muscle Use] : no accessory muscle use [Clear to Auscultation] : lungs were clear to auscultation bilaterally [Normal Rate] : normal rate  [No Murmur] : no murmur heard [Normal S1, S2] : normal S1 and S2 [Regular Rhythm] : with a regular rhythm [No Abdominal Bruit] : a ~M bruit was not heard ~T in the abdomen [No Carotid Bruits] : no carotid bruits [Pedal Pulses Present] : the pedal pulses are present [No Varicosities] : no varicosities [No Edema] : there was no peripheral edema [No Palpable Aorta] : no palpable aorta [Non Tender] : non-tender [No Extremity Clubbing/Cyanosis] : no extremity clubbing/cyanosis [Soft] : abdomen soft [No Masses] : no abdominal mass palpated [Non-distended] : non-distended [Normal Bowel Sounds] : normal bowel sounds [No HSM] : no HSM [Normal Posterior Cervical Nodes] : no posterior cervical lymphadenopathy [Normal Anterior Cervical Nodes] : no anterior cervical lymphadenopathy [No Spinal Tenderness] : no spinal tenderness [No Joint Swelling] : no joint swelling [No CVA Tenderness] : no CVA  tenderness [Grossly Normal Strength/Tone] : grossly normal strength/tone [No Rash] : no rash [Coordination Grossly Intact] : coordination grossly intact [Normal Gait] : normal gait [No Focal Deficits] : no focal deficits [Normal Affect] : the affect was normal [Deep Tendon Reflexes (DTR)] : deep tendon reflexes were 2+ and symmetric [Normal Insight/Judgement] : insight and judgment were intact

## 2020-01-14 NOTE — HEALTH RISK ASSESSMENT
[No] : In the past 12 months have you used drugs other than those required for medical reasons? No [0] : 2) Feeling down, depressed, or hopeless: Not at all (0) [Audit-CScore] : 0 [] : No [[id number]] : 0

## 2020-01-14 NOTE — ADDENDUM
[FreeTextEntry1] : I, Mark Euceda, acted solely as scribe for Dr. Emerson Smith DO on this date 01/13/2020  3:00PM .\par \par All medical record entries made by the Scribe were at my, Dr. Emerson Smith DO direction and personally dictated by me on 01/13/2020  3:00PM. I have reviewed the chart and agree that the record accurately reflects my personal performance of the history, physical exam, assessment and plan. I have also personally directed, reviewed and agreed with the chart.\par

## 2020-01-14 NOTE — HISTORY OF PRESENT ILLNESS
[Spouse] : spouse [FreeTextEntry1] : Rx refill and general follow-up [de-identified] : Patient is a 74 year old female with a past medical history as below who presents for Rx refill and general follow-up. Patient states she is taking all medications as prescribed and denies any adverse reactions or side effects. She notes less frequent UTIs since starting Methenamine Hippurate and OTC Cranberry capsules. Patient requests Rx refill for Lyrica and Oxycodone which she states is helping with chronic back and neck pain secondary to spinal stenosis. \par

## 2020-01-30 NOTE — PLAN
“Patient's name, , procedure and correct site were confirmed during the [facility] Timeout.” [FreeTextEntry1] : Neurology- multiple sclerosis-Lumbar stenosis  continue with Lyrica one tablet 3 times a day. Advised to follow with neurologist.  Continue all medications.  Checked  [id number]\par \par Rheumatology- osteoporosis-   Advised to have a good source of  calcium daily. Decrease caffeine intake. Will monitor bone density scans. \par .\par \par Immunization FLU HD was given left deltoid .  \par refused Tdap today \par PPD in 2019 was negative oxo mm

## 2020-02-11 ENCOUNTER — APPOINTMENT (OUTPATIENT)
Dept: INTERNAL MEDICINE | Facility: CLINIC | Age: 75
End: 2020-02-11
Payer: MEDICARE

## 2020-02-11 VITALS
OXYGEN SATURATION: 97 % | HEIGHT: 66 IN | RESPIRATION RATE: 15 BRPM | HEART RATE: 80 BPM | TEMPERATURE: 98 F | DIASTOLIC BLOOD PRESSURE: 68 MMHG | SYSTOLIC BLOOD PRESSURE: 116 MMHG

## 2020-02-11 DIAGNOSIS — G47.00 INSOMNIA, UNSPECIFIED: ICD-10-CM

## 2020-02-11 PROCEDURE — 99213 OFFICE O/P EST LOW 20 MIN: CPT

## 2020-02-11 RX ORDER — OXYCODONE AND ACETAMINOPHEN 10; 325 MG/1; MG/1
10-325 TABLET ORAL
Qty: 90 | Refills: 0 | Status: DISCONTINUED | COMMUNITY
Start: 2020-01-13 | End: 2020-02-11

## 2020-02-11 NOTE — ADDENDUM
[FreeTextEntry1] : I, Mark Euceda, acted solely as scribe for Dr. Emerson Smith DO on this date 02/11/2020  3:20PM .\par \par All medical record entries made by the Scribe were at my, Dr. Emerson Smith DO direction and personally dictated by me on 02/11/2020  3:20PM. I have reviewed the chart and agree that the record accurately reflects my personal performance of the history, physical exam, assessment and plan. I have also personally directed, reviewed and agreed with the chart.\par

## 2020-02-11 NOTE — HEALTH RISK ASSESSMENT
[No] : In the past 12 months have you used drugs other than those required for medical reasons? No [0] : 2) Feeling down, depressed, or hopeless: Not at all (0) [] : No [Audit-CScore] : 0 [[id number]] : 0

## 2020-02-11 NOTE — HISTORY OF PRESENT ILLNESS
[Spouse] : spouse [FreeTextEntry1] : Rx refill and general follow-up [de-identified] : Patient is a 74 year old female with a past medical history as below who presents for Rx refill and general follow-up. Patient states she is taking all medications as prescribed and denies any adverse reactions or side effects. She notes less frequent UTIs since starting Methenamine Hippurate and OTC Cranberry capsules. Patient requests Rx refill for Lyrica which she states is helping with chronic back and neck pain secondary to spinal stenosis. She notes intermittently waking up at night secondary to pain.

## 2020-02-11 NOTE — PLAN
[FreeTextEntry1] : Musculoskeletal \par osteoporosis - continue Prolia 60mg/mL subcutaneous injections every 6 months\par chronic back/neck pain - secondary to spinal stenosis - continue Lyrica (Pregabalin) 75mg TID p.o.q.d., Rx filled,  reviewed; advised to take the third dose of the medication shortly before going to sleep  \par Psychiatry\par continue Venlafaxine HCl 25mg TID p.o.q.d. as directed \par Urology\par chronic UTIs - continue Methenamine Hippurate 1 GM p.o.q.d. as directed and OTC Cranberry 405mg BID p.o.q.d.\par incontinence/overactive bladder - continue Trospium Chloride ER 60mg p.o.q.d

## 2020-03-27 DIAGNOSIS — L03.119 CELLULITIS OF UNSPECIFIED PART OF LIMB: ICD-10-CM

## 2020-04-15 ENCOUNTER — RX RENEWAL (OUTPATIENT)
Age: 75
End: 2020-04-15

## 2020-04-20 ENCOUNTER — APPOINTMENT (OUTPATIENT)
Dept: INTERNAL MEDICINE | Facility: CLINIC | Age: 75
End: 2020-04-20

## 2020-05-13 ENCOUNTER — RX RENEWAL (OUTPATIENT)
Age: 75
End: 2020-05-13

## 2020-05-18 ENCOUNTER — APPOINTMENT (OUTPATIENT)
Dept: INTERNAL MEDICINE | Facility: CLINIC | Age: 75
End: 2020-05-18
Payer: MEDICARE

## 2020-05-18 VITALS
HEART RATE: 91 BPM | RESPIRATION RATE: 15 BRPM | DIASTOLIC BLOOD PRESSURE: 70 MMHG | HEIGHT: 66 IN | SYSTOLIC BLOOD PRESSURE: 112 MMHG | OXYGEN SATURATION: 96 % | TEMPERATURE: 98.6 F

## 2020-05-18 DIAGNOSIS — Z00.00 ENCOUNTER FOR GENERAL ADULT MEDICAL EXAMINATION W/OUT ABNORMAL FINDINGS: ICD-10-CM

## 2020-05-18 PROCEDURE — 99214 OFFICE O/P EST MOD 30 MIN: CPT | Mod: 25

## 2020-05-18 PROCEDURE — 36415 COLL VENOUS BLD VENIPUNCTURE: CPT

## 2020-05-18 NOTE — HEALTH RISK ASSESSMENT
[] : No [No] : In the past 12 months have you used drugs other than those required for medical reasons? No [0] : 2) Feeling down, depressed, or hopeless: Not at all (0) [Audit-CScore] : 0 [[id number]] : 0

## 2020-05-18 NOTE — PHYSICAL EXAM
[No Acute Distress] : no acute distress [Well Nourished] : well nourished [Well Developed] : well developed [Well-Appearing] : well-appearing [Normal Sclera/Conjunctiva] : normal sclera/conjunctiva [PERRL] : pupils equal round and reactive to light [EOMI] : extraocular movements intact [Normal Outer Ear/Nose] : the outer ears and nose were normal in appearance [Normal Oropharynx] : the oropharynx was normal [No JVD] : no jugular venous distention [No Lymphadenopathy] : no lymphadenopathy [Supple] : supple [Thyroid Normal, No Nodules] : the thyroid was normal and there were no nodules present [No Respiratory Distress] : no respiratory distress  [No Accessory Muscle Use] : no accessory muscle use [Clear to Auscultation] : lungs were clear to auscultation bilaterally [Normal Rate] : normal rate  [Regular Rhythm] : with a regular rhythm [Normal S1, S2] : normal S1 and S2 [No Murmur] : no murmur heard [No Carotid Bruits] : no carotid bruits [No Abdominal Bruit] : a ~M bruit was not heard ~T in the abdomen [No Varicosities] : no varicosities [Pedal Pulses Present] : the pedal pulses are present [No Edema] : there was no peripheral edema [No Palpable Aorta] : no palpable aorta [No Extremity Clubbing/Cyanosis] : no extremity clubbing/cyanosis [Soft] : abdomen soft [Non Tender] : non-tender [Non-distended] : non-distended [No Masses] : no abdominal mass palpated [No HSM] : no HSM [Normal Bowel Sounds] : normal bowel sounds [Normal Posterior Cervical Nodes] : no posterior cervical lymphadenopathy [Normal Anterior Cervical Nodes] : no anterior cervical lymphadenopathy [No CVA Tenderness] : no CVA  tenderness [Kyphosis] : kyphosis [No Joint Swelling] : no joint swelling [Grossly Normal Strength/Tone] : grossly normal strength/tone [No Rash] : no rash [Coordination Grossly Intact] : coordination grossly intact [No Focal Deficits] : no focal deficits [Deep Tendon Reflexes (DTR)] : deep tendon reflexes were 2+ and symmetric [Normal Affect] : the affect was normal [Normal Insight/Judgement] : insight and judgment were intact [Normal] : affect was normal and insight and judgment were intact [de-identified] : antalgic gait

## 2020-05-18 NOTE — HISTORY OF PRESENT ILLNESS
[Spouse] : spouse [FreeTextEntry1] : fasting blood work general follow-up [de-identified] : Patient is a 75 year old female with a past medical history as below who presents for fasting blood work and general follow-up. Patient states she is taking all medications as prescribed and denies any adverse reactions or side effects. She notes less frequent UTIs since starting Methenamine Hippurate and OTC Cranberry capsules. Patient requests Rx refill for Lyrica which she states is helping with chronic back and neck pain secondary to spinal stenosis. She notes intermittently waking up at night secondary to pain.

## 2020-05-18 NOTE — ASSESSMENT
[FreeTextEntry1] : Patient is a 75  year old female with a past medical history as above who presents for Rx refill, fasting blood work and general follow-up.

## 2020-05-18 NOTE — PLAN
[FreeTextEntry1] : Musculoskeletal \par osteoporosis - continue Prolia 60mg/mL subcutaneous injections every 6 months\par chronic back/neck pain - secondary to spinal stenosis - continue Lyrica (Pregabalin) 75mg TID p.o.q.d., Rx filled,  reviewed; advised to take the third dose of the medication shortly before going to sleep  \par Psychiatry\par continue Venlafaxine HCl 25mg TID p.o.q.d. as directed \par Urology\par chronic UTIs - continue Methenamine Hippurate 1 GM p.o.q.d. as directed and OTC Cranberry 405mg BID p.o.q.d.\par incontinence/overactive bladder - continue Trospium Chloride ER 60mg p.o.q.d\par \par check female panel, a1c and Covid antibodies

## 2020-05-21 LAB
25(OH)D3 SERPL-MCNC: 28.5 NG/ML
ALBUMIN SERPL ELPH-MCNC: 4.5 G/DL
ALP BLD-CCNC: 57 U/L
ALT SERPL-CCNC: 10 U/L
ANION GAP SERPL CALC-SCNC: 14 MMOL/L
AST SERPL-CCNC: 14 U/L
BASOPHILS # BLD AUTO: 0.04 K/UL
BASOPHILS NFR BLD AUTO: 0.6 %
BILIRUB SERPL-MCNC: 0.2 MG/DL
BUN SERPL-MCNC: 17 MG/DL
CALCIUM SERPL-MCNC: 10.1 MG/DL
CHLORIDE SERPL-SCNC: 103 MMOL/L
CHOLEST SERPL-MCNC: 258 MG/DL
CHOLEST/HDLC SERPL: 3.5 RATIO
CO2 SERPL-SCNC: 26 MMOL/L
CREAT SERPL-MCNC: 0.89 MG/DL
EOSINOPHIL # BLD AUTO: 0.18 K/UL
EOSINOPHIL NFR BLD AUTO: 2.9 %
ESTIMATED AVERAGE GLUCOSE: 120 MG/DL
GLUCOSE SERPL-MCNC: 90 MG/DL
HBA1C MFR BLD HPLC: 5.8 %
HCT VFR BLD CALC: 44.3 %
HDLC SERPL-MCNC: 74 MG/DL
HGB BLD-MCNC: 13.7 G/DL
IMM GRANULOCYTES NFR BLD AUTO: 0.3 %
LDLC SERPL CALC-MCNC: 161 MG/DL
LYMPHOCYTES # BLD AUTO: 1.69 K/UL
LYMPHOCYTES NFR BLD AUTO: 27.2 %
MAN DIFF?: NORMAL
MCHC RBC-ENTMCNC: 28.7 PG
MCHC RBC-ENTMCNC: 30.9 GM/DL
MCV RBC AUTO: 92.9 FL
MONOCYTES # BLD AUTO: 0.68 K/UL
MONOCYTES NFR BLD AUTO: 10.9 %
NEUTROPHILS # BLD AUTO: 3.61 K/UL
NEUTROPHILS NFR BLD AUTO: 58.1 %
PLATELET # BLD AUTO: 230 K/UL
POTASSIUM SERPL-SCNC: 5.1 MMOL/L
PROT SERPL-MCNC: 7.1 G/DL
RBC # BLD: 4.77 M/UL
RBC # FLD: 13.5 %
SARS-COV-2 IGG SERPL IA-ACNC: <0.1 INDEX
SARS-COV-2 IGG SERPL QL IA: NEGATIVE
SODIUM SERPL-SCNC: 143 MMOL/L
TRIGL SERPL-MCNC: 120 MG/DL
TSH SERPL-ACNC: 4.23 UIU/ML
WBC # FLD AUTO: 6.22 K/UL

## 2020-06-12 ENCOUNTER — APPOINTMENT (OUTPATIENT)
Dept: INTERNAL MEDICINE | Facility: CLINIC | Age: 75
End: 2020-06-12
Payer: MEDICARE

## 2020-06-12 VITALS
SYSTOLIC BLOOD PRESSURE: 112 MMHG | HEIGHT: 66 IN | TEMPERATURE: 99.4 F | OXYGEN SATURATION: 97 % | DIASTOLIC BLOOD PRESSURE: 66 MMHG | RESPIRATION RATE: 15 BRPM | HEART RATE: 94 BPM

## 2020-06-12 PROCEDURE — 99214 OFFICE O/P EST MOD 30 MIN: CPT

## 2020-06-14 NOTE — ASSESSMENT
[FreeTextEntry1] : Patient is a 75  year old female with a past medical history as above who presents for Rx refill and general follow-up.

## 2020-06-14 NOTE — HEALTH RISK ASSESSMENT
[No] : In the past 12 months have you used drugs other than those required for medical reasons? No [0] : 2) Feeling down, depressed, or hopeless: Not at all (0) [BoneDensityComments] : Revealed osteoporosis.  [BoneDensityDate] : 07/19 [] : No [[id number]] : 0 [Audit-CScore] : 0

## 2020-06-14 NOTE — ADDENDUM
[FreeTextEntry1] : I, Mark Euceda, acted solely as scribe for Dr. Emerson Smith DO on this date 06/12/2020  2:50PM .\par \par All medical record entries made by the Scribe were at my, Dr. Emerson Smith DO direction and personally dictated by me on 06/12/2020  2:50PM. I have reviewed the chart and agree that the record accurately reflects my personal performance of the history, physical exam, assessment and plan. I have also personally directed, reviewed and agreed with the chart.

## 2020-06-14 NOTE — PLAN
[FreeTextEntry1] : Musculoskeletal \par left hip pain - advised to follow up with orthopedist for a cortisone injection\par osteoporosis - continue Prolia 60mg/mL subcutaneous injections every 6 months\par chronic back/neck pain - secondary to spinal stenosis - continue Lyrica (Pregabalin) 75mg TID p.o.q.d., Rx filled,  reviewed and scanned into chart; previously advised to take the third dose of the medication shortly before going to sleep \par Psychiatry\par continue Venlafaxine HCl 25mg TID p.o.q.d. as directed \par Urology\par chronic UTIs - continue Methenamine Hippurate 1 GM p.o.q.d. as directed and OTC Cranberry 405mg BID p.o.q.d.\par incontinence/overactive bladder - continue Trospium Chloride ER 60mg p.o.q.d

## 2020-06-14 NOTE — HISTORY OF PRESENT ILLNESS
[Spouse] : spouse [de-identified] : Patient is a 75 year old female with a past medical history as below who presents for Rx refill and general follow-up. Patient states she is taking all medications as prescribed and denies any adverse reactions or side effects. She notes less frequent UTIs since starting Methenamine Hippurate and OTC Cranberry capsules. Patient notes left hip pain when she turns in bed or when changing from a seated position (on the floor) to a standing position. She characterizes the pain as sharp and notes it lasts for a second. X-Ray of the hips in March 2019 revealed mild arthrosis of the hips bilaterally without any acute fracture or dislocation. She states receiving a cortisone injection in the past helped. Patient requests Rx refill for Lyrica which she states is helping with chronic back and neck pain secondary to spinal stenosis. She notes intermittently waking up at night secondary to pain.  [FreeTextEntry1] : Rx refill general follow-up

## 2020-07-10 ENCOUNTER — RX RENEWAL (OUTPATIENT)
Age: 75
End: 2020-07-10

## 2020-07-10 ENCOUNTER — APPOINTMENT (OUTPATIENT)
Dept: INTERNAL MEDICINE | Facility: CLINIC | Age: 75
End: 2020-07-10
Payer: MEDICARE

## 2020-07-10 VITALS
BODY MASS INDEX: 24.11 KG/M2 | HEART RATE: 102 BPM | RESPIRATION RATE: 16 BRPM | OXYGEN SATURATION: 94 % | TEMPERATURE: 98.3 F | WEIGHT: 150 LBS | HEIGHT: 66 IN | DIASTOLIC BLOOD PRESSURE: 66 MMHG | SYSTOLIC BLOOD PRESSURE: 122 MMHG

## 2020-07-10 PROCEDURE — 99213 OFFICE O/P EST LOW 20 MIN: CPT | Mod: 25

## 2020-07-10 PROCEDURE — 96372 THER/PROPH/DIAG INJ SC/IM: CPT

## 2020-07-10 NOTE — PHYSICAL EXAM
[No Acute Distress] : no acute distress [Well Nourished] : well nourished [Well Developed] : well developed [Well-Appearing] : well-appearing [Normal Sclera/Conjunctiva] : normal sclera/conjunctiva [PERRL] : pupils equal round and reactive to light [EOMI] : extraocular movements intact [Normal Outer Ear/Nose] : the outer ears and nose were normal in appearance [Normal Oropharynx] : the oropharynx was normal [No JVD] : no jugular venous distention [Supple] : supple [No Lymphadenopathy] : no lymphadenopathy [Thyroid Normal, No Nodules] : the thyroid was normal and there were no nodules present [No Respiratory Distress] : no respiratory distress  [No Accessory Muscle Use] : no accessory muscle use [Clear to Auscultation] : lungs were clear to auscultation bilaterally [Normal Rate] : normal rate  [Regular Rhythm] : with a regular rhythm [Normal S1, S2] : normal S1 and S2 [No Murmur] : no murmur heard [No Abdominal Bruit] : a ~M bruit was not heard ~T in the abdomen [No Carotid Bruits] : no carotid bruits [No Varicosities] : no varicosities [Pedal Pulses Present] : the pedal pulses are present [No Edema] : there was no peripheral edema [No Palpable Aorta] : no palpable aorta [No Extremity Clubbing/Cyanosis] : no extremity clubbing/cyanosis [Soft] : abdomen soft [Non Tender] : non-tender [No Masses] : no abdominal mass palpated [Non-distended] : non-distended [Normal Bowel Sounds] : normal bowel sounds [No HSM] : no HSM [Normal Posterior Cervical Nodes] : no posterior cervical lymphadenopathy [Normal Anterior Cervical Nodes] : no anterior cervical lymphadenopathy [No CVA Tenderness] : no CVA  tenderness [No Spinal Tenderness] : no spinal tenderness [No Joint Swelling] : no joint swelling [Grossly Normal Strength/Tone] : grossly normal strength/tone [No Rash] : no rash [Coordination Grossly Intact] : coordination grossly intact [No Focal Deficits] : no focal deficits [Normal Gait] : normal gait [Deep Tendon Reflexes (DTR)] : deep tendon reflexes were 2+ and symmetric [Normal Affect] : the affect was normal [Normal Insight/Judgement] : insight and judgment were intact

## 2020-07-12 NOTE — ADDENDUM
[FreeTextEntry1] : I, Mark Euceda, acted solely as scribe for Dr. Emerson Smith DO on this date 07/10/2020  9:50AM .\par \par All medical record entries made by the Scribe were at my, Dr. Emerson Smith DO direction and personally dictated by me on 07/10/2020  9:50AM. I have reviewed the chart and agree that the record accurately reflects my personal performance of the history, physical exam, assessment and plan. I have also personally directed, reviewed and agreed with the chart.\par

## 2020-07-12 NOTE — ASSESSMENT
[FreeTextEntry1] : Patient is a 75 year old female with a past medical history as above who presents for her Prolia injection.

## 2020-07-12 NOTE — PLAN
[FreeTextEntry1] : Musculoskeletal \par osteoporosis - Prolia 60mg/mL, 1mL administered subcutaneously to RUE (triceps area)\par chronic back/neck pain - secondary to spinal stenosis - continue Lyrica (Pregabalin) 75mg TID p.o.q.d; previously advised to take the third dose of the medication shortly before going to sleep \par ENT/Immunology\par allergies - continue Zyrtec p.o. p.r.n. \par Psychiatry\par continue Venlafaxine HCl 25mg TID p.o.q.d. as directed \par Urology\par chronic UTIs - continue Methenamine Hippurate 1 GM p.o.q.d. as directed and OTC Cranberry 405mg BID p.o.q.d.\par incontinence/overactive bladder - continue Trospium Chloride ER 60mg p.o.q.d

## 2020-07-12 NOTE — HISTORY OF PRESENT ILLNESS
[Spouse] : spouse [FreeTextEntry1] : Prolia injection  [de-identified] : Patient is a 75 year old female with a past medical history as below who presents for her Prolia injection. DEXA scan in July 2019 revealed osteoporosis. Patient states she is taking all medications as prescribed and denies any adverse reactions or side effects. She states she feels well overall with no new complaints.

## 2020-07-12 NOTE — HEALTH RISK ASSESSMENT
[No] : In the past 12 months have you used drugs other than those required for medical reasons? No [0] : 2) Feeling down, depressed, or hopeless: Not at all (0) [] : No [[id number]] : 0 [Audit-CScore] : 0 [BoneDensityDate] : 07/19 [BoneDensityComments] : Revealed osteoporosis.

## 2020-07-20 ENCOUNTER — RX RENEWAL (OUTPATIENT)
Age: 75
End: 2020-07-20

## 2020-07-22 ENCOUNTER — RX RENEWAL (OUTPATIENT)
Age: 75
End: 2020-07-22

## 2020-07-28 ENCOUNTER — APPOINTMENT (OUTPATIENT)
Dept: INTERNAL MEDICINE | Facility: CLINIC | Age: 75
End: 2020-07-28
Payer: MEDICARE

## 2020-07-28 VITALS
WEIGHT: 150 LBS | TEMPERATURE: 99.1 F | BODY MASS INDEX: 24.11 KG/M2 | OXYGEN SATURATION: 96 % | DIASTOLIC BLOOD PRESSURE: 74 MMHG | HEART RATE: 79 BPM | RESPIRATION RATE: 16 BRPM | HEIGHT: 66 IN | SYSTOLIC BLOOD PRESSURE: 120 MMHG

## 2020-07-28 PROCEDURE — 99213 OFFICE O/P EST LOW 20 MIN: CPT

## 2020-07-28 NOTE — PLAN
[FreeTextEntry1] : Musculoskeletal \par osteoporosis - continue Prolia 60mg/mL subcutaneous injections every 6 months\par chronic back/neck pain - secondary to spinal stenosis - continue Lyrica (Pregabalin) 75mg TID p.o.q.d., Rx filled,  reviewed and scanned into chart; previously advised to take the third dose of the medication shortly before going to sleep \par Psychiatry\par continue Venlafaxine HCl 25mg TID p.o.q.d. as directed \par Urology\par chronic UTIs - continue Methenamine Hippurate 1 GM p.o.q.d. as directed and OTC Cranberry 405mg BID p.o.q.d.\par incontinence/overactive bladder - continue Trospium Chloride ER 60mg p.o.q.d

## 2020-07-28 NOTE — PHYSICAL EXAM
[No Acute Distress] : no acute distress [Well Nourished] : well nourished [Well Developed] : well developed [Well-Appearing] : well-appearing [PERRL] : pupils equal round and reactive to light [Normal Sclera/Conjunctiva] : normal sclera/conjunctiva [Normal Outer Ear/Nose] : the outer ears and nose were normal in appearance [Normal Oropharynx] : the oropharynx was normal [EOMI] : extraocular movements intact [No JVD] : no jugular venous distention [No Lymphadenopathy] : no lymphadenopathy [Supple] : supple [Thyroid Normal, No Nodules] : the thyroid was normal and there were no nodules present [No Respiratory Distress] : no respiratory distress  [Clear to Auscultation] : lungs were clear to auscultation bilaterally [No Accessory Muscle Use] : no accessory muscle use [Normal Rate] : normal rate  [Regular Rhythm] : with a regular rhythm [Normal S1, S2] : normal S1 and S2 [No Carotid Bruits] : no carotid bruits [No Murmur] : no murmur heard [No Abdominal Bruit] : a ~M bruit was not heard ~T in the abdomen [No Varicosities] : no varicosities [Pedal Pulses Present] : the pedal pulses are present [No Edema] : there was no peripheral edema [No Extremity Clubbing/Cyanosis] : no extremity clubbing/cyanosis [Soft] : abdomen soft [No Palpable Aorta] : no palpable aorta [Non-distended] : non-distended [Non Tender] : non-tender [No Masses] : no abdominal mass palpated [No HSM] : no HSM [Normal Bowel Sounds] : normal bowel sounds [Normal Posterior Cervical Nodes] : no posterior cervical lymphadenopathy [No CVA Tenderness] : no CVA  tenderness [No Spinal Tenderness] : no spinal tenderness [Normal Anterior Cervical Nodes] : no anterior cervical lymphadenopathy [No Joint Swelling] : no joint swelling [Grossly Normal Strength/Tone] : grossly normal strength/tone [Coordination Grossly Intact] : coordination grossly intact [No Rash] : no rash [Normal Gait] : normal gait [No Focal Deficits] : no focal deficits [Deep Tendon Reflexes (DTR)] : deep tendon reflexes were 2+ and symmetric [Normal Affect] : the affect was normal [Normal Insight/Judgement] : insight and judgment were intact

## 2020-07-28 NOTE — HEALTH RISK ASSESSMENT
[No] : In the past 12 months have you used drugs other than those required for medical reasons? No [0] : 2) Feeling down, depressed, or hopeless: Not at all (0) [] : No [Audit-CScore] : 0 [[id number]] : 0 [BoneDensityDate] : 07/19 [BoneDensityComments] : Revealed osteoporosis.

## 2020-07-28 NOTE — ADDENDUM
[FreeTextEntry1] : I, Mark Euceda, acted solely as scribe for Dr. Emerson Smith DO on this date 07/28/2020  4:00PM .\par \par All medical record entries made by the Scribe were at my, Dr. Emerson Smith DO direction and personally dictated by me on 07/28/2020  4:00PM. I have reviewed the chart and agree that the record accurately reflects my personal performance of the history, physical exam, assessment and plan. I have also personally directed, reviewed and agreed with the chart.\par

## 2020-07-28 NOTE — HISTORY OF PRESENT ILLNESS
[Spouse] : spouse [FreeTextEntry1] : Rx refill general follow-up [de-identified] : Patient is a 75 year old female with a past medical history as below who presents for Rx refill and general follow-up. Patient states she is taking all medications as prescribed and denies any adverse reactions or side effects. She notes less frequent UTIs since starting Methenamine Hippurate and OTC Cranberry capsules. Patient notes receiving 2 cortisone injections into the left hip 2 days ago. She notes improvement in left hip pain. Patient requests Rx refill for Lyrica (Pregabalin) which she states is helping with chronic back and neck pain secondary to spinal stenosis.

## 2020-08-28 ENCOUNTER — APPOINTMENT (OUTPATIENT)
Dept: INTERNAL MEDICINE | Facility: CLINIC | Age: 75
End: 2020-08-28

## 2020-09-29 ENCOUNTER — RX RENEWAL (OUTPATIENT)
Age: 75
End: 2020-09-29

## 2020-10-12 ENCOUNTER — APPOINTMENT (OUTPATIENT)
Dept: INTERNAL MEDICINE | Facility: CLINIC | Age: 75
End: 2020-10-12
Payer: MEDICARE

## 2020-10-12 VITALS
HEART RATE: 86 BPM | BODY MASS INDEX: 24.27 KG/M2 | SYSTOLIC BLOOD PRESSURE: 118 MMHG | RESPIRATION RATE: 16 BRPM | OXYGEN SATURATION: 97 % | WEIGHT: 151 LBS | TEMPERATURE: 97.9 F | HEIGHT: 66 IN | DIASTOLIC BLOOD PRESSURE: 84 MMHG

## 2020-10-12 PROCEDURE — 90662 IIV NO PRSV INCREASED AG IM: CPT

## 2020-10-12 PROCEDURE — 99213 OFFICE O/P EST LOW 20 MIN: CPT | Mod: 25

## 2020-10-12 PROCEDURE — G0008: CPT

## 2020-10-12 NOTE — ADDENDUM
[FreeTextEntry1] : I, Mark Euceda, acted solely as scribe for Dr. Emerson Smith DO on this date 10/12/2020  3:50PM .\par \par All medical record entries made by the Scribe were at my, Dr. Emerson Smith DO direction and personally dictated by me on 10/12/2020  3:50PM. I have reviewed the chart and agree that the record accurately reflects my personal performance of the history, physical exam, assessment and plan. I have also personally directed, reviewed and agreed with the chart.\par

## 2020-10-12 NOTE — ASSESSMENT
[FreeTextEntry1] : Patient is a 75  year old female with a past medical history as above who presents for flu vaccine administration.

## 2020-10-12 NOTE — HEALTH RISK ASSESSMENT
[No] : In the past 12 months have you used drugs other than those required for medical reasons? No [0] : 2) Feeling down, depressed, or hopeless: Not at all (0) [] : No [Audit-CScore] : 0 [[id number]] : 0 [BoneDensityDate] : 07/19 [BoneDensityComments] : Revealed osteoporosis.

## 2020-10-12 NOTE — PLAN
[FreeTextEntry1] : Immunization\par flu vaccine - Fluzone Quadrivalent High-Dose 0.7mL x 1 administered intramuscularly to left deltoid\par Musculoskeletal \par osteoporosis - continue Prolia 60mg/mL subcutaneous injections every 6 months\par chronic back/neck pain - secondary to spinal stenosis - continue Lyrica (Pregabalin) 75mg TID p.o.q.d; previously advised to take the third dose of the medication shortly before going to sleep \par Psychiatry\par continue Venlafaxine HCl 25mg TID p.o.q.d. as directed \par Urology\par chronic UTIs - continue Methenamine Hippurate 1 GM p.o.q.d. as directed and OTC Cranberry 405mg BID p.o.q.d.\par incontinence/overactive bladder - continue Trospium Chloride ER 60mg p.o.q.d

## 2020-10-12 NOTE — HISTORY OF PRESENT ILLNESS
[Spouse] : spouse [FreeTextEntry1] : flu vaccine  [de-identified] : Patient is a 75 year old female with a past medical history as below who presents for flu vaccine administration. Patient states she is taking all medications as prescribed and denies any adverse reactions or side effects. She notes less frequent UTIs since starting Methenamine Hippurate and OTC Cranberry capsules.

## 2020-11-02 ENCOUNTER — RX RENEWAL (OUTPATIENT)
Age: 75
End: 2020-11-02

## 2020-11-13 ENCOUNTER — APPOINTMENT (OUTPATIENT)
Dept: INTERNAL MEDICINE | Facility: CLINIC | Age: 75
End: 2020-11-13
Payer: MEDICARE

## 2020-11-13 VITALS
TEMPERATURE: 97.2 F | WEIGHT: 154.25 LBS | DIASTOLIC BLOOD PRESSURE: 62 MMHG | OXYGEN SATURATION: 97 % | SYSTOLIC BLOOD PRESSURE: 108 MMHG | HEART RATE: 99 BPM | RESPIRATION RATE: 16 BRPM | BODY MASS INDEX: 24.79 KG/M2 | HEIGHT: 66 IN

## 2020-11-13 PROCEDURE — 99213 OFFICE O/P EST LOW 20 MIN: CPT | Mod: 25

## 2020-11-13 RX ORDER — CEPHALEXIN 250 MG/1
250 TABLET ORAL EVERY 6 HOURS
Qty: 56 | Refills: 0 | Status: COMPLETED | COMMUNITY
Start: 2020-03-27 | End: 2020-04-13

## 2020-11-13 RX ORDER — OXYCODONE AND ACETAMINOPHEN 10; 325 MG/1; MG/1
10-325 TABLET ORAL
Qty: 90 | Refills: 0 | Status: DISCONTINUED | COMMUNITY
Start: 2020-11-02 | End: 2020-11-13

## 2020-11-14 NOTE — HISTORY OF PRESENT ILLNESS
[Spouse] : spouse [FreeTextEntry1] : Rx refill general follow-up [de-identified] : Patient is a 75 year old female with a past medical history as below who presents for Rx refill and general follow-up. Patient states she is taking all medications as prescribed and denies any adverse reactions or side effects. She notes less frequent UTIs since starting Methenamine Hippurate and OTC Cranberry capsules. Patient notes a sore throat and itchy eyes. Patient requests Rx refill for Lyrica (Pregabalin) which she states is helping with chronic back and neck pain secondary to spinal stenosis.

## 2020-11-14 NOTE — PHYSICAL EXAM
[No Acute Distress] : no acute distress [Well Nourished] : well nourished [Well Developed] : well developed [Well-Appearing] : well-appearing [Normal Sclera/Conjunctiva] : normal sclera/conjunctiva [PERRL] : pupils equal round and reactive to light [EOMI] : extraocular movements intact [Normal Outer Ear/Nose] : the outer ears and nose were normal in appearance [No JVD] : no jugular venous distention [No Lymphadenopathy] : no lymphadenopathy [Supple] : supple [Thyroid Normal, No Nodules] : the thyroid was normal and there were no nodules present [No Respiratory Distress] : no respiratory distress  [No Accessory Muscle Use] : no accessory muscle use [Clear to Auscultation] : lungs were clear to auscultation bilaterally [Normal Rate] : normal rate  [Regular Rhythm] : with a regular rhythm [Normal S1, S2] : normal S1 and S2 [No Murmur] : no murmur heard [No Carotid Bruits] : no carotid bruits [No Abdominal Bruit] : a ~M bruit was not heard ~T in the abdomen [No Varicosities] : no varicosities [Pedal Pulses Present] : the pedal pulses are present [No Edema] : there was no peripheral edema [No Palpable Aorta] : no palpable aorta [No Extremity Clubbing/Cyanosis] : no extremity clubbing/cyanosis [Soft] : abdomen soft [Non Tender] : non-tender [Non-distended] : non-distended [No Masses] : no abdominal mass palpated [No HSM] : no HSM [Normal Bowel Sounds] : normal bowel sounds [Normal Posterior Cervical Nodes] : no posterior cervical lymphadenopathy [Normal Anterior Cervical Nodes] : no anterior cervical lymphadenopathy [No CVA Tenderness] : no CVA  tenderness [No Spinal Tenderness] : no spinal tenderness [No Joint Swelling] : no joint swelling [Grossly Normal Strength/Tone] : grossly normal strength/tone [No Rash] : no rash [Coordination Grossly Intact] : coordination grossly intact [No Focal Deficits] : no focal deficits [Normal Gait] : normal gait [Normal Affect] : the affect was normal [Normal Insight/Judgement] : insight and judgment were intact [de-identified] : post-nasal drip

## 2020-11-14 NOTE — HEALTH RISK ASSESSMENT
[No] : In the past 12 months have you used drugs other than those required for medical reasons? No [0] : 2) Feeling down, depressed, or hopeless: Not at all (0) [] : No [Audit-CScore] : 0 [[id number]] : 0 [BoneDensityDate] : 07/19 [BoneDensityComments] : Revealed osteoporosis.

## 2020-11-14 NOTE — PLAN
[FreeTextEntry1] : ENT/Immunology\par itchy eyes/sore throat - likely secondary to seasonal allergies - recommended starting non-sedating antihistamine (OTC Claritin, Allegra, or Zyrtec) p.o.q.d.  \par Musculoskeletal \par osteoporosis - continue Prolia 60mg/mL subcutaneous injections every 6 months\par chronic back/neck pain - secondary to spinal stenosis - continue Lyrica (Pregabalin) 75mg TID p.o.q.d., Rx filled,  reviewed and scanned into chart; previously advised to take the third dose of the medication shortly before going to sleep; continue Meloxicam 15mg p.o.q.d. with food as directed \par Psychiatry\par continue Venlafaxine HCl 25mg TID p.o.q.d. as directed \par Urology\par chronic UTIs - continue Methenamine Hippurate 1 GM p.o.q.d. as directed and OTC Cranberry 405mg BID p.o.q.d.\par incontinence/overactive bladder - continue Trospium Chloride ER 60mg p.o.q.d

## 2020-11-14 NOTE — ADDENDUM
[FreeTextEntry1] : I, Mark Euceda, acted solely as scribe for Dr. Emerson Smith DO on this date 11/13/2020  3:40PM .\par \par All medical record entries made by the Scribe were at my, Dr. Emerson Smith DO direction and personally dictated by me on 11/13/2020  3:40PM. I have reviewed the chart and agree that the record accurately reflects my personal performance of the history, physical exam, assessment and plan. I have also personally directed, reviewed and agreed with the chart.\par

## 2020-11-23 ENCOUNTER — RX RENEWAL (OUTPATIENT)
Age: 75
End: 2020-11-23

## 2020-12-18 ENCOUNTER — RX RENEWAL (OUTPATIENT)
Age: 75
End: 2020-12-18

## 2020-12-21 ENCOUNTER — APPOINTMENT (OUTPATIENT)
Dept: INTERNAL MEDICINE | Facility: CLINIC | Age: 75
End: 2020-12-21
Payer: MEDICARE

## 2020-12-21 VITALS
DIASTOLIC BLOOD PRESSURE: 60 MMHG | RESPIRATION RATE: 16 BRPM | HEIGHT: 66 IN | OXYGEN SATURATION: 97 % | SYSTOLIC BLOOD PRESSURE: 110 MMHG | WEIGHT: 158 LBS | BODY MASS INDEX: 25.39 KG/M2 | HEART RATE: 88 BPM

## 2020-12-21 DIAGNOSIS — M25.552 PAIN IN LEFT HIP: ICD-10-CM

## 2020-12-21 DIAGNOSIS — M54.2 CERVICALGIA: ICD-10-CM

## 2020-12-21 PROBLEM — Z86.69 HISTORY OF CONJUNCTIVITIS: Status: RESOLVED | Noted: 2019-08-26 | Resolved: 2020-12-21

## 2020-12-21 PROCEDURE — 99214 OFFICE O/P EST MOD 30 MIN: CPT

## 2020-12-21 NOTE — PLAN
[FreeTextEntry1] : Musculoskeletal \par osteoporosis - continue Prolia 60mg/mL subcutaneous injections every 6 months\par chronic back/neck pain - secondary to spinal stenosis - continue Lyrica (Pregabalin) 75mg TID p.o.q.d., Rx filled,  reviewed and scanned into chart; previously advised to take the third dose of the medication shortly before going to sleep; continue Meloxicam 15mg p.o.q.d. with food as directed \par left hip pain - Rx given for X-Ray of left hip to r/o fracture\par Neurology\par left cervical radiculopathy - Rx given for MRI of cervical spine without contrast \par Psychiatry\par continue Venlafaxine HCl 25mg TID p.o.q.d. as directed \par Urology\par chronic UTIs - continue Methenamine Hippurate 1 GM p.o.q.d. as directed and OTC Cranberry 405mg BID p.o.q.d.\par incontinence/overactive bladder - continue Trospium Chloride ER 60mg p.o.q.d\par \par Patient to follow up in 1 month for fasting blood work.

## 2020-12-21 NOTE — HEALTH RISK ASSESSMENT
[No] : In the past 12 months have you used drugs other than those required for medical reasons? No [0] : 2) Feeling down, depressed, or hopeless: Not at all (0) [] : No [Audit-CScore] : 0 [[id number]] : 0 [BoneDensityDate] : 07/19 [BoneDensityComments] : Revealed osteoporosis.

## 2020-12-21 NOTE — PHYSICAL EXAM
[No Acute Distress] : no acute distress [Well Nourished] : well nourished [Well Developed] : well developed [Well-Appearing] : well-appearing [Normal Sclera/Conjunctiva] : normal sclera/conjunctiva [PERRL] : pupils equal round and reactive to light [EOMI] : extraocular movements intact [Normal Outer Ear/Nose] : the outer ears and nose were normal in appearance [Normal Oropharynx] : the oropharynx was normal [No JVD] : no jugular venous distention [No Lymphadenopathy] : no lymphadenopathy [Supple] : supple [Thyroid Normal, No Nodules] : the thyroid was normal and there were no nodules present [No Respiratory Distress] : no respiratory distress  [No Accessory Muscle Use] : no accessory muscle use [Clear to Auscultation] : lungs were clear to auscultation bilaterally [Normal Rate] : normal rate  [Regular Rhythm] : with a regular rhythm [Normal S1, S2] : normal S1 and S2 [No Murmur] : no murmur heard [No Carotid Bruits] : no carotid bruits [No Abdominal Bruit] : a ~M bruit was not heard ~T in the abdomen [No Varicosities] : no varicosities [Pedal Pulses Present] : the pedal pulses are present [No Edema] : there was no peripheral edema [No Palpable Aorta] : no palpable aorta [No Extremity Clubbing/Cyanosis] : no extremity clubbing/cyanosis [Soft] : abdomen soft [Non Tender] : non-tender [Non-distended] : non-distended [No Masses] : no abdominal mass palpated [No HSM] : no HSM [Normal Bowel Sounds] : normal bowel sounds [Normal Posterior Cervical Nodes] : no posterior cervical lymphadenopathy [Normal Anterior Cervical Nodes] : no anterior cervical lymphadenopathy [No CVA Tenderness] : no CVA  tenderness [No Spinal Tenderness] : no spinal tenderness [No Joint Swelling] : no joint swelling [Grossly Normal Strength/Tone] : grossly normal strength/tone [No Rash] : no rash [Coordination Grossly Intact] : coordination grossly intact [No Focal Deficits] : no focal deficits [Normal Gait] : normal gait [Normal Affect] : the affect was normal [Normal Insight/Judgement] : insight and judgment were intact [de-identified] : spasm of left paravertebral muscles

## 2020-12-21 NOTE — HISTORY OF PRESENT ILLNESS
[Spouse] : spouse [FreeTextEntry1] : Rx refill general follow-up [de-identified] : Patient is a 75 year old female with a past medical history as below who presents for Rx refill and general follow-up. Patient states she is taking all medications as prescribed and denies any adverse reactions or side effects. She notes less frequent UTIs since starting Methenamine Hippurate and OTC Cranberry capsules. Patient notes left hip pain and cervical neck pain radiating into her left scapula/LLE. She notes decreased feeling in the LLE thought to be secondary to neuropathy. As such, she has had multiple falls over the course of the last 6 weeks. She notes 3-4 falls on her left side during this 6-week period of time. Patient requests Rx refill for Lyrica (Pregabalin) which she states is helping with chronic back and neck pain secondary to spinal stenosis.

## 2020-12-21 NOTE — REVIEW OF SYSTEMS
[Negative] : Heme/Lymph [Joint Pain] : joint pain [FreeTextEntry9] : left hip pain [de-identified] : cervical neck pain radiating into left scapula/LLE; decreased feeling in LLE

## 2020-12-21 NOTE — ADDENDUM
[FreeTextEntry1] : I, Makr Euceda, acted solely as scribe for Dr. Emerson Smith DO on this date 12/21/2020  1:40PM .\par \par All medical record entries made by the Scribe were at my, Dr. Emerson Smith DO direction and personally dictated by me on 12/21/2020  1:40PM. I have reviewed the chart and agree that the record accurately reflects my personal performance of the history, physical exam, assessment and plan. I have also personally directed, reviewed and agreed with the chart.\par

## 2020-12-22 ENCOUNTER — RX RENEWAL (OUTPATIENT)
Age: 75
End: 2020-12-22

## 2020-12-27 ENCOUNTER — RX RENEWAL (OUTPATIENT)
Age: 75
End: 2020-12-27

## 2021-01-13 ENCOUNTER — APPOINTMENT (OUTPATIENT)
Dept: INTERNAL MEDICINE | Facility: CLINIC | Age: 76
End: 2021-01-13
Payer: MEDICARE

## 2021-01-13 VITALS
DIASTOLIC BLOOD PRESSURE: 70 MMHG | OXYGEN SATURATION: 97 % | WEIGHT: 150 LBS | RESPIRATION RATE: 16 BRPM | HEIGHT: 66 IN | TEMPERATURE: 97.1 F | HEART RATE: 91 BPM | SYSTOLIC BLOOD PRESSURE: 110 MMHG | BODY MASS INDEX: 24.11 KG/M2

## 2021-01-13 PROCEDURE — 96372 THER/PROPH/DIAG INJ SC/IM: CPT

## 2021-01-13 PROCEDURE — 99213 OFFICE O/P EST LOW 20 MIN: CPT | Mod: 25

## 2021-01-14 NOTE — HEALTH RISK ASSESSMENT
[No] : In the past 12 months have you used drugs other than those required for medical reasons? No [0] : 2) Feeling down, depressed, or hopeless: Not at all (0) [] : No [Audit-CScore] : 0 [[id number]] : 0 [BoneDensityDate] : 07/19 [BoneDensityComments] : Osteoporosis.

## 2021-01-14 NOTE — HISTORY OF PRESENT ILLNESS
[FreeTextEntry1] : Prolia injection [de-identified] : Patient is a 75 year old female with a past medical history as below who presents for her Prolia injection. Patient states she is taking all medications as prescribed and denies any adverse reactions or side effects. Patient states she feels well overall with no new complaints.

## 2021-01-14 NOTE — ADDENDUM
[FreeTextEntry1] : I, Mark Euceda, acted solely as scribe for Dr. Emerson Smith DO on this date 01/13/2021  1:00PM .\par \par All medical record entries made by the Scribe were at my, Dr. Emerson Smith DO direction and personally dictated by me on 01/13/2021  1:00PM. I have reviewed the chart and agree that the record accurately reflects my personal performance of the history, physical exam, assessment and plan. I have also personally directed, reviewed and agreed with the chart.\par

## 2021-01-26 ENCOUNTER — RX RENEWAL (OUTPATIENT)
Age: 76
End: 2021-01-26

## 2021-02-03 ENCOUNTER — APPOINTMENT (OUTPATIENT)
Dept: INTERNAL MEDICINE | Facility: CLINIC | Age: 76
End: 2021-02-03

## 2021-02-11 ENCOUNTER — LABORATORY RESULT (OUTPATIENT)
Age: 76
End: 2021-02-11

## 2021-02-11 ENCOUNTER — APPOINTMENT (OUTPATIENT)
Dept: INTERNAL MEDICINE | Facility: CLINIC | Age: 76
End: 2021-02-11
Payer: MEDICARE

## 2021-02-11 PROCEDURE — 36415 COLL VENOUS BLD VENIPUNCTURE: CPT

## 2021-02-11 PROCEDURE — 99214 OFFICE O/P EST MOD 30 MIN: CPT | Mod: 25

## 2021-02-11 RX ORDER — OXYCODONE AND ACETAMINOPHEN 10; 325 MG/1; MG/1
10-325 TABLET ORAL
Qty: 90 | Refills: 0 | Status: DISCONTINUED | COMMUNITY
Start: 2021-02-02 | End: 2021-02-11

## 2021-02-11 NOTE — HISTORY OF PRESENT ILLNESS
[FreeTextEntry1] : fasting blood work and general follow-up\par  [de-identified] : Patient is a 75 year old female with a past medical history as below who presents for fasting blood work and general follow-up. Patient states she is taking all medications as prescribed and denies any adverse reactions or side effects. Patient states she feels well overall with no new complaints.

## 2021-02-11 NOTE — ASSESSMENT
[FreeTextEntry1] : Patient is a 75 year old female with a past medical history as above who presents for fasting blood work and general follow-up.\par

## 2021-02-11 NOTE — HEALTH RISK ASSESSMENT
[No] : In the past 12 months have you used drugs other than those required for medical reasons? No [0] : 2) Feeling down, depressed, or hopeless: Not at all (0) [] : No [ESA6Pmjsf] : 0 [Audit-CScore] : 0 [BoneDensityDate] : 07/19 [BoneDensityComments] : Osteoporosis.

## 2021-02-11 NOTE — ADDENDUM
[FreeTextEntry1] : I, Mark Euceda, acted solely as scribe for Dr. Emerson Smith DO on this date 02/11/2021 12:20PM .\par \par All medical record entries made by the Scribe were at my, Dr. Emerson Smith DO direction and personally dictated by me on 02/11/2021 12:20PM. I have reviewed the chart and agree that the record accurately reflects my personal performance of the history, physical exam, assessment and plan. I have also personally directed, reviewed and agreed with the chart.\par

## 2021-02-15 LAB
25(OH)D3 SERPL-MCNC: 27.4 NG/ML
ALBUMIN SERPL ELPH-MCNC: 4.3 G/DL
ALP BLD-CCNC: 50 U/L
ALT SERPL-CCNC: 10 U/L
ANION GAP SERPL CALC-SCNC: 11 MMOL/L
AST SERPL-CCNC: 14 U/L
BASOPHILS # BLD AUTO: 0.03 K/UL
BASOPHILS NFR BLD AUTO: 0.6 %
BILIRUB SERPL-MCNC: 0.3 MG/DL
BUN SERPL-MCNC: 14 MG/DL
CALCIUM SERPL-MCNC: 9.4 MG/DL
CHLORIDE SERPL-SCNC: 105 MMOL/L
CHOLEST SERPL-MCNC: 209 MG/DL
CO2 SERPL-SCNC: 25 MMOL/L
CREAT SERPL-MCNC: 0.8 MG/DL
EOSINOPHIL # BLD AUTO: 0.22 K/UL
EOSINOPHIL NFR BLD AUTO: 4.2 %
ESTIMATED AVERAGE GLUCOSE: 123 MG/DL
GLUCOSE SERPL-MCNC: 108 MG/DL
HBA1C MFR BLD HPLC: 5.9 %
HCT VFR BLD CALC: 44 %
HDLC SERPL-MCNC: 69 MG/DL
HGB BLD-MCNC: 13.8 G/DL
IMM GRANULOCYTES NFR BLD AUTO: 0.2 %
LDLC SERPL CALC-MCNC: 119 MG/DL
LYMPHOCYTES # BLD AUTO: 1.45 K/UL
LYMPHOCYTES NFR BLD AUTO: 27.6 %
MAN DIFF?: NORMAL
MCHC RBC-ENTMCNC: 28.7 PG
MCHC RBC-ENTMCNC: 31.4 GM/DL
MCV RBC AUTO: 91.5 FL
MONOCYTES # BLD AUTO: 0.5 K/UL
MONOCYTES NFR BLD AUTO: 9.5 %
NEUTROPHILS # BLD AUTO: 3.05 K/UL
NEUTROPHILS NFR BLD AUTO: 57.9 %
NONHDLC SERPL-MCNC: 140 MG/DL
PLATELET # BLD AUTO: 244 K/UL
POTASSIUM SERPL-SCNC: 4.8 MMOL/L
PROT SERPL-MCNC: 6.9 G/DL
RBC # BLD: 4.81 M/UL
RBC # FLD: 13.8 %
SODIUM SERPL-SCNC: 141 MMOL/L
T3 SERPL-MCNC: 112 NG/DL
T3FREE SERPL-MCNC: 2.85 PG/ML
T3RU NFR SERPL: 1.1 TBI
T4 FREE SERPL-MCNC: 1.1 NG/DL
T4 SERPL-MCNC: 6.7 UG/DL
THYROGLOB AB SERPL-ACNC: <20 IU/ML
THYROPEROXIDASE AB SERPL IA-ACNC: <10 IU/ML
TRIGL SERPL-MCNC: 107 MG/DL
TSH SERPL-ACNC: 3.15 UIU/ML
WBC # FLD AUTO: 5.26 K/UL

## 2021-02-25 ENCOUNTER — RX RENEWAL (OUTPATIENT)
Age: 76
End: 2021-02-25

## 2021-02-28 ENCOUNTER — RX RENEWAL (OUTPATIENT)
Age: 76
End: 2021-02-28

## 2021-03-26 ENCOUNTER — APPOINTMENT (OUTPATIENT)
Dept: INTERNAL MEDICINE | Facility: CLINIC | Age: 76
End: 2021-03-26
Payer: MEDICARE

## 2021-03-26 VITALS — SYSTOLIC BLOOD PRESSURE: 100 MMHG | RESPIRATION RATE: 16 BRPM | DIASTOLIC BLOOD PRESSURE: 60 MMHG | HEART RATE: 76 BPM

## 2021-03-26 PROCEDURE — 99214 OFFICE O/P EST MOD 30 MIN: CPT

## 2021-03-28 NOTE — HEALTH RISK ASSESSMENT
[No] : In the past 12 months have you used drugs other than those required for medical reasons? No [0] : 2) Feeling down, depressed, or hopeless: Not at all (0) [] : No [Audit-CScore] : 0 [DCQ7Jaxam] : 0 [BoneDensityDate] : 07/19 [BoneDensityComments] : Revealed osteoporosis.

## 2021-03-28 NOTE — ASSESSMENT
[FreeTextEntry1] : Patient is a 75  year old female with a past medical history as above who presents for previous blood work discussion, Rx refill, and general follow-up.

## 2021-03-28 NOTE — PLAN
[FreeTextEntry1] : Musculoskeletal \par chronic back/neck pain - secondary to spinal stenosis - start Oxycodone-Acetaminophen 10-325mg TID p.o. p.r.n. as directed, Rx filled,  reviewed and scanned into chart;  continue Lyrica (Pregabalin) 75mg TID p.o.q.d., Rx filled,  reviewed and scanned into chart; previously advised to take the third dose of the medication shortly before going to sleep; continue Meloxicam 15mg p.o. with food as directed - Rx given for PT\par Cardiology\par hyperlipidemia - continue low cholesterol/low fat diet \par Endocrinology\par hyperglycemia - continue low carbohydrate/low sugar diet \par Endocrinology/Musculoskeletal\par osteoporosis - continue Prolia 60mg/mL subcutaneous injections every 6 months\par Psychiatry\par continue Venlafaxine HCl 25mg TID p.o.q.d. as directed \par Urology\par chronic UTIs - continue Methenamine Hippurate 1 GM p.o.q.d. as directed and OTC Cranberry 405mg BID p.o.q.d.\par incontinence/overactive bladder - continue Trospium Chloride ER 60mg p.o.q.d\par \par Blood work results were reviewed in detail with patient.

## 2021-03-28 NOTE — HISTORY OF PRESENT ILLNESS
[FreeTextEntry1] : previous blood work discussion, Rx refill, general follow-up [de-identified] : Patient is a 75 year old female with a past medical history as below who presents for previous blood work discussion, Rx refill, and general follow-up. Blood work done on 2/11/21 revealed normal CBC, normal CMP except elevated blood-glucose (108), elevated total cholesterol (209), normal triglycerides (107), high HDL (69), elevated LDL (119), elevated non-HDL cholesterol (140), elevated HGB A1C (5.9), normal TFTs, and low vitamin D (27.4). Patient states she is taking all medications as prescribed and denies any adverse reactions or side effects. She notes less frequent UTIs since starting Methenamine Hippurate and OTC Cranberry capsules. Patient inquires about starting PT given worsening back/neck pain (secondary to spinal stenosis). She has noted LUE pain since the COVID-19 Vaccine was administered. Patient requests Rx refill for Lyrica (Pregabalin).

## 2021-03-28 NOTE — ADDENDUM
[FreeTextEntry1] : I, Mark Euceda, acted solely as scribe for Dr. Emerson Smith DO on this date 03/26/2021  1:00PM .\par \par All medical record entries made by the Scribe were at my, Dr. Emerson Smith DO direction and personally dictated by me on 03/26/2021  1:00PM. I have reviewed the chart and agree that the record accurately reflects my personal performance of the history, physical exam, assessment and plan. I have also personally directed, reviewed and agreed with the chart.\par

## 2021-05-11 ENCOUNTER — APPOINTMENT (OUTPATIENT)
Dept: INTERNAL MEDICINE | Facility: CLINIC | Age: 76
End: 2021-05-11
Payer: MEDICARE

## 2021-05-11 VITALS
TEMPERATURE: 97.8 F | DIASTOLIC BLOOD PRESSURE: 78 MMHG | HEIGHT: 66 IN | BODY MASS INDEX: 24.27 KG/M2 | RESPIRATION RATE: 16 BRPM | WEIGHT: 151 LBS | HEART RATE: 96 BPM | SYSTOLIC BLOOD PRESSURE: 102 MMHG | OXYGEN SATURATION: 98 %

## 2021-05-11 PROCEDURE — 99213 OFFICE O/P EST LOW 20 MIN: CPT

## 2021-05-11 RX ORDER — OXYCODONE AND ACETAMINOPHEN 10; 325 MG/1; MG/1
10-325 TABLET ORAL
Qty: 90 | Refills: 0 | Status: DISCONTINUED | COMMUNITY
Start: 2021-03-26 | End: 2021-05-11

## 2021-05-11 NOTE — HEALTH RISK ASSESSMENT
[No] : In the past 12 months have you used drugs other than those required for medical reasons? No [0] : 2) Feeling down, depressed, or hopeless: Not at all (0) [] : No [Audit-CScore] : 0 [BJL3Plyia] : 0 [BoneDensityDate] : 07/19 [BoneDensityComments] : Revealed osteoporosis.

## 2021-05-11 NOTE — HISTORY OF PRESENT ILLNESS
[FreeTextEntry1] : Rx refill and general follow-up [de-identified] : Patient is a 75 year old female with a past medical history as below who presents for Rx refill and general follow-up. Patient states she is taking all medications as prescribed and denies any adverse reactions or side effects. She notes less frequent UTIs since starting Methenamine Hippurate and OTC Cranberry capsules. Patient requests Rx refill for Lyrica (Pregabalin) which she states is helping with chronic back and neck pain secondary to spinal stenosis. \par

## 2021-05-11 NOTE — ADDENDUM
[FreeTextEntry1] : I, Mark Euceda, acted solely as scribe for Dr. Emerson Smith DO on this date 05/11/2021  1:00PM .\par \par All medical record entries made by the Scribe were at my, Dr. Emerson Smith DO direction and personally dictated by me on 05/11/2021  1:00PM. I have reviewed the chart and agree that the record accurately reflects my personal performance of the history, physical exam, assessment and plan. I have also personally directed, reviewed and agreed with the chart.\par

## 2021-05-11 NOTE — PLAN
[FreeTextEntry1] : Musculoskeletal \par chronic back/neck pain - secondary to spinal stenosis - continue Lyrica (Pregabalin) 75mg TID p.o.q.d., Rx filled,  reviewed and scanned into chart; previously advised to take the third dose of the medication shortly before going to sleep; continue Meloxicam 15mg p.o. with food as directed - Rx previously given for PT\par Cardiology\par hyperlipidemia - continue low cholesterol/low fat diet \par Endocrinology\par hyperglycemia - continue low carbohydrate/low sugar diet \par Endocrinology/Musculoskeletal\par osteoporosis - continue Prolia 60mg/mL subcutaneous injections every 6 months\par Psychiatry\par continue Venlafaxine HCl 25mg TID p.o.q.d. as directed \par Urology\par chronic UTIs - continue Methenamine Hippurate 1 GM p.o.q.d. as directed and OTC Cranberry 405mg BID p.o.q.d.\par incontinence/overactive bladder - continue Trospium Chloride ER 60mg p.o.q.d

## 2021-06-08 ENCOUNTER — RX RENEWAL (OUTPATIENT)
Age: 76
End: 2021-06-08

## 2021-06-17 ENCOUNTER — RX RENEWAL (OUTPATIENT)
Age: 76
End: 2021-06-17

## 2021-06-18 ENCOUNTER — LABORATORY RESULT (OUTPATIENT)
Age: 76
End: 2021-06-18

## 2021-06-18 ENCOUNTER — APPOINTMENT (OUTPATIENT)
Dept: INTERNAL MEDICINE | Facility: CLINIC | Age: 76
End: 2021-06-18
Payer: MEDICARE

## 2021-06-18 VITALS
SYSTOLIC BLOOD PRESSURE: 120 MMHG | HEART RATE: 90 BPM | TEMPERATURE: 97.3 F | DIASTOLIC BLOOD PRESSURE: 68 MMHG | RESPIRATION RATE: 14 BRPM | HEIGHT: 66 IN | OXYGEN SATURATION: 94 %

## 2021-06-18 PROCEDURE — 99214 OFFICE O/P EST MOD 30 MIN: CPT | Mod: 25

## 2021-06-18 PROCEDURE — 36415 COLL VENOUS BLD VENIPUNCTURE: CPT

## 2021-06-19 NOTE — ADDENDUM
[FreeTextEntry1] : I, Mark Euceda, acted solely as scribe for Dr. Emerson Smith DO on this date 06/18/2021  1:00PM .\par \par All medical record entries made by the Scribe were at my, Dr. Emerson Smith DO direction and personally dictated by me on 06/18/2021  1:00PM. I have reviewed the chart and agree that the record accurately reflects my personal performance of the history, physical exam, assessment and plan. I have also personally directed, reviewed and agreed with the chart.\par

## 2021-06-19 NOTE — REVIEW OF SYSTEMS
[Negative] : Heme/Lymph [Back Pain] : back pain [FreeTextEntry2] : feeling weak; difficulty ambulating [FreeTextEntry9] : d

## 2021-06-19 NOTE — PLAN
[FreeTextEntry1] : Musculoskeletal \par chronic back/neck pain - secondary to spinal stenosis - continue Lyrica (Pregabalin) 75mg TID p.o.q.d. as directed; previously advised to take the third dose of the medication shortly before going to sleep; continue Meloxicam 15mg p.o. with food as directed; will set up home PT for patient given complaints of weakness and difficulty ambulating \par Neurology\par history of MS - referred to neurologist, Dr. De La Torre for further evaluation and to ensure current complaints are not secondary to worsening MS\par Cardiology\par hyperlipidemia - continue low cholesterol/low fat diet - check FLP\par Endocrinology\par hyperglycemia - continue low carbohydrate/low sugar diet - check hemoglobin A1C\par history of hyperthyroidism - check thyroid panel\par Vitamin D insufficiency - check Vitamin D\par Endocrinology/Musculoskeletal\par osteoporosis - patient's next Prolia injection to be scheduled - check Total Serum Calcium and Vitamin D \par Psychiatry\par continue Venlafaxine HCl 25mg TID p.o.q.d. as directed \par Urology\par chronic UTIs - continue Methenamine Hippurate 1 GM p.o.q.d. as directed and OTC Cranberry 405mg BID p.o.q.d.\par incontinence/overactive bladder - continue Trospium Chloride ER 60mg p.o.q.d. as directed\par \par check CMP, FLP, hemoglobin A1C, thyroid panel, Total Serum Calcium, and Vitamin D

## 2021-06-19 NOTE — HISTORY OF PRESENT ILLNESS
[FreeTextEntry1] : fasting blood work and general follow-up [de-identified] : Patient is a 76 year old female with a past medical history as below who presents for fasting blood work and general follow-up. Patient states she is taking all medications as prescribed and denies any adverse reactions or side effects. She notes less frequent UTIs since starting Methenamine Hippurate and OTC Cranberry capsules. Patient notes difficulty ambulating secondary to lumbar back pain and feeling weak. Patient notes history of MS (not currently being treated) and states she last saw a neurologist 10+ years ago. Patient is due for her next Prolia injection.

## 2021-06-19 NOTE — ASSESSMENT
[FreeTextEntry1] : Patient is a 76  year old female with a past medical history as above who presents for fasting blood work and general follow-up.

## 2021-06-19 NOTE — HEALTH RISK ASSESSMENT
[No] : In the past 12 months have you used drugs other than those required for medical reasons? No [0] : 2) Feeling down, depressed, or hopeless: Not at all (0) [] : No [Audit-CScore] : 0 [AVN1Tzosp] : 0 [PapSmearComments] : S/p hysterectomy.  [BoneDensityDate] : 07/19 [BoneDensityComments] : Revealed osteoporosis.

## 2021-06-24 LAB
25(OH)D3 SERPL-MCNC: 31.7 NG/ML
ALBUMIN SERPL ELPH-MCNC: 4.1 G/DL
ALP BLD-CCNC: 51 U/L
ALT SERPL-CCNC: 12 U/L
ANION GAP SERPL CALC-SCNC: 13 MMOL/L
AST SERPL-CCNC: 15 U/L
BILIRUB SERPL-MCNC: 0.3 MG/DL
BUN SERPL-MCNC: 21 MG/DL
CALCIUM SERPL-MCNC: 9.5 MG/DL
CHLORIDE SERPL-SCNC: 105 MMOL/L
CHOLEST SERPL-MCNC: 214 MG/DL
CO2 SERPL-SCNC: 24 MMOL/L
CREAT SERPL-MCNC: 0.75 MG/DL
ESTIMATED AVERAGE GLUCOSE: 126 MG/DL
GLUCOSE SERPL-MCNC: 112 MG/DL
HBA1C MFR BLD HPLC: 6 %
HDLC SERPL-MCNC: 72 MG/DL
LDLC SERPL CALC-MCNC: 120 MG/DL
NONHDLC SERPL-MCNC: 142 MG/DL
POTASSIUM SERPL-SCNC: 4.6 MMOL/L
PROT SERPL-MCNC: 6.7 G/DL
SODIUM SERPL-SCNC: 142 MMOL/L
T3 SERPL-MCNC: 93 NG/DL
T3FREE SERPL-MCNC: 2.35 PG/ML
T3RU NFR SERPL: 1.1 TBI
T4 FREE SERPL-MCNC: 1 NG/DL
T4 SERPL-MCNC: 6.4 UG/DL
THYROGLOB AB SERPL-ACNC: <20 IU/ML
THYROPEROXIDASE AB SERPL IA-ACNC: <10 IU/ML
TRIGL SERPL-MCNC: 112 MG/DL
TSH SERPL-ACNC: 2.44 UIU/ML

## 2021-08-02 ENCOUNTER — APPOINTMENT (OUTPATIENT)
Dept: INTERNAL MEDICINE | Facility: CLINIC | Age: 76
End: 2021-08-02

## 2021-08-06 ENCOUNTER — APPOINTMENT (OUTPATIENT)
Dept: INTERNAL MEDICINE | Facility: CLINIC | Age: 76
End: 2021-08-06
Payer: MEDICARE

## 2021-08-06 VITALS
RESPIRATION RATE: 16 BRPM | WEIGHT: 145 LBS | HEART RATE: 97 BPM | SYSTOLIC BLOOD PRESSURE: 118 MMHG | DIASTOLIC BLOOD PRESSURE: 70 MMHG | TEMPERATURE: 97.7 F | BODY MASS INDEX: 23.3 KG/M2 | HEIGHT: 66 IN | OXYGEN SATURATION: 97 %

## 2021-08-06 PROCEDURE — 99213 OFFICE O/P EST LOW 20 MIN: CPT

## 2021-08-07 NOTE — ASSESSMENT
[FreeTextEntry1] : Patient is a 76  year old female with a past medical history as above who presents for her next Prolia injection.

## 2021-08-07 NOTE — HISTORY OF PRESENT ILLNESS
[Family Member] : family member [FreeTextEntry1] : Prolia injection  [de-identified] : Patient is a 76 year old female with a past medical history as below who presents for her next Prolia injection. Patient states she is taking all medications as prescribed and denies any adverse reactions or side effects. She notes less frequent UTIs since starting Methenamine Hippurate and OTC Cranberry capsules. Patient is due for annual breast imaging. Patient feels well overall with no new complaints.

## 2021-08-07 NOTE — ADDENDUM
[FreeTextEntry1] : I, Mark Euceda, acted solely as scribe for Dr. Emerson Smith DO on this date 08/06/2021  1:00PM .\par \par All medical record entries made by the Scribe were at my, Dr. Emerson Smith DO direction and personally dictated by me on 08/06/2021  1:00PM. I have reviewed the chart and agree that the record accurately reflects my personal performance of the history, physical exam, assessment and plan. I have also personally directed, reviewed and agreed with the chart.\par

## 2021-08-07 NOTE — HEALTH RISK ASSESSMENT
[No] : In the past 12 months have you used drugs other than those required for medical reasons? No [0] : 2) Feeling down, depressed, or hopeless: Not at all (0) [PHQ-2 Negative - No further assessment needed] : PHQ-2 Negative - No further assessment needed [] : No [Audit-CScore] : 0 [QJQ0Pkjix] : 0 [MammogramComments] : Rx given for Mammogram and Breast US.  [PapSmearComments] : S/p hysterectomy.  [BoneDensityDate] : 07/19 [BoneDensityComments] : Revealed osteoporosis.

## 2021-08-07 NOTE — PLAN
[FreeTextEntry1] : Endocrinology/Musculoskeletal\par osteoporosis - Prolia 60mg/mL administered subcutaneously to left triceps area \par Cardiology\par hyperlipidemia - continue low cholesterol/low fat diet\par Endocrinology\par hyperglycemia - continue low carbohydrate/low sugar diet \par Gynecology\par Rx given for Mammogram and Breast US \par Musculoskeletal \par chronic back/neck pain - secondary to spinal stenosis - continue Lyrica (Pregabalin) 75mg TID p.o.q.d. as directed; previously advised to take the third dose of the medication shortly before going to sleep; continue Meloxicam 15mg p.o. with food as directed\par Neurology\par history of MS - previously referred to neurologist, Dr. De La Torre for further evaluation and to ensure prior complaints were not secondary to worsening MS\par Psychiatry\par continue Venlafaxine HCl 25mg TID p.o.q.d. as directed \par Urology\par chronic UTIs - continue Methenamine Hippurate 1 GM p.o.q.d. as directed and OTC Cranberry 405mg BID p.o.q.d.\par incontinence/overactive bladder - continue Trospium Chloride ER 60mg p.o.q.d. as directed\par \par Patient to follow up for fasting blood work.

## 2021-08-30 ENCOUNTER — RX RENEWAL (OUTPATIENT)
Age: 76
End: 2021-08-30

## 2021-08-30 ENCOUNTER — LABORATORY RESULT (OUTPATIENT)
Age: 76
End: 2021-08-30

## 2021-08-30 ENCOUNTER — APPOINTMENT (OUTPATIENT)
Dept: INTERNAL MEDICINE | Facility: CLINIC | Age: 76
End: 2021-08-30
Payer: MEDICARE

## 2021-08-30 VITALS
RESPIRATION RATE: 16 BRPM | WEIGHT: 145 LBS | DIASTOLIC BLOOD PRESSURE: 72 MMHG | TEMPERATURE: 207.32 F | HEIGHT: 66 IN | SYSTOLIC BLOOD PRESSURE: 106 MMHG | OXYGEN SATURATION: 97 % | BODY MASS INDEX: 23.3 KG/M2 | HEART RATE: 97 BPM

## 2021-08-30 DIAGNOSIS — M54.12 RADICULOPATHY, CERVICAL REGION: ICD-10-CM

## 2021-08-30 DIAGNOSIS — J30.2 OTHER SEASONAL ALLERGIC RHINITIS: ICD-10-CM

## 2021-08-30 PROCEDURE — 99214 OFFICE O/P EST MOD 30 MIN: CPT | Mod: 25

## 2021-08-30 PROCEDURE — 36415 COLL VENOUS BLD VENIPUNCTURE: CPT

## 2021-08-30 NOTE — ADDENDUM
[FreeTextEntry1] : I, Mark Euceda, acted solely as scribe for Dr. Emerson Smith DO on this date 08/30/2021  9:40AM .\par \par All medical record entries made by the Scribe were at my, Dr. Emerson Smith DO direction and personally dictated by me on 08/30/2021  9:40AM. I have reviewed the chart and agree that the record accurately reflects my personal performance of the history, physical exam, assessment and plan. I have also personally directed, reviewed and agreed with the chart.\par

## 2021-08-30 NOTE — REVIEW OF SYSTEMS
[Negative] : Heme/Lymph [Itching] : itching [Nasal Discharge] : nasal discharge [de-identified] : LLE weakness

## 2021-08-30 NOTE — HEALTH RISK ASSESSMENT
[No] : In the past 12 months have you used drugs other than those required for medical reasons? No [0] : 2) Feeling down, depressed, or hopeless: Not at all (0) [PHQ-2 Negative - No further assessment needed] : PHQ-2 Negative - No further assessment needed [] : No [Audit-CScore] : 0 [HUZ9Dyrcv] : 0 [MammogramComments] : Rx previously given for Mammogram and Breast US.  [PapSmearComments] : S/p hysterectomy.  [BoneDensityDate] : 07/19 [BoneDensityComments] : Revealed osteoporosis.

## 2021-08-30 NOTE — PLAN
[FreeTextEntry1] : ENT/Immunology\par seasonal allergies - decrease Zyrtec from BID to once daily; start Montelukast Sodium (Singulair) 10mg p.o.q.d. as directed\par Cardiology\par hyperlipidemia - continue low cholesterol/low fat diet - check FLP\par Endocrinology\par hyperglycemia - continue low carbohydrate/low sugar diet - check HGB A1C\par history of hyperthyroidism - check thyroid panel\par history of Vitamin D insufficiency - check Vitamin D\par Gynecology\par Rx previously given for Mammogram and Breast US \par Endocrinology/Musculoskeletal\par osteoporosis - continue Prolia 60mg/mL subcutaneous injections q. 6 months \par Musculoskeletal \par chronic back/neck pain - secondary to spinal stenosis - continue Lyrica (Pregabalin) 75mg TID p.o.q.d. as directed; previously advised to take the third dose of the medication shortly before going to sleep; continue Meloxicam 15mg p.o. with food as directed\par Neurology\par LLE weakness with history of MS - follow up with neurologist, Dr. Lee for further evaluation  \par Psychiatry\par continue Venlafaxine HCl 25mg TID p.o.q.d. as directed \par Urology\par chronic UTIs - continue Methenamine Hippurate 1 GM p.o.q.d. as directed and OTC Cranberry 405mg BID p.o.q.d.\par incontinence/overactive bladder - continue Trospium Chloride ER 60mg p.o.q.d. as directed\par \par check female panel, hemoglobin A1C, and thyroid panel

## 2021-08-30 NOTE — HISTORY OF PRESENT ILLNESS
[FreeTextEntry1] : fasting blood work and general follow-up\par  [de-identified] : Patient is a 76 year old female with a past medical history as below who presents for fasting blood work and general follow-up. Patient states she is taking all medications as prescribed and denies any adverse reactions or side effects. Patient notes LLE weakness. She will be seeing neurologist, Dr. Lee on 9/3/21. Patient notes worsening allergy symptoms (itchy eyes, runny nose). She has been taking Zyrtec BID without significant relief. She states the carpets and ducts in her home are currently being cleaned. Patient has been maintaining a low carbohydrate/low sugar diet. She notes consuming less M&Ms and consuming more popcorn.

## 2021-09-06 LAB
25(OH)D3 SERPL-MCNC: 33.6 NG/ML
ALBUMIN SERPL ELPH-MCNC: 4.2 G/DL
ALP BLD-CCNC: 56 U/L
ALT SERPL-CCNC: 11 U/L
ANION GAP SERPL CALC-SCNC: 10 MMOL/L
AST SERPL-CCNC: 13 U/L
BASOPHILS # BLD AUTO: 0.02 K/UL
BASOPHILS NFR BLD AUTO: 0.3 %
BILIRUB SERPL-MCNC: 0.3 MG/DL
BUN SERPL-MCNC: 14 MG/DL
CALCIUM SERPL-MCNC: 8.9 MG/DL
CHLORIDE SERPL-SCNC: 106 MMOL/L
CHOLEST SERPL-MCNC: 211 MG/DL
CO2 SERPL-SCNC: 25 MMOL/L
CREAT SERPL-MCNC: 0.71 MG/DL
EOSINOPHIL # BLD AUTO: 0.3 K/UL
EOSINOPHIL NFR BLD AUTO: 4.4 %
ESTIMATED AVERAGE GLUCOSE: 123 MG/DL
GLUCOSE SERPL-MCNC: 105 MG/DL
HBA1C MFR BLD HPLC: 5.9 %
HCT VFR BLD CALC: 45.5 %
HDLC SERPL-MCNC: 75 MG/DL
HGB BLD-MCNC: 14.1 G/DL
IMM GRANULOCYTES NFR BLD AUTO: 0.4 %
LDLC SERPL CALC-MCNC: 116 MG/DL
LYMPHOCYTES # BLD AUTO: 1.39 K/UL
LYMPHOCYTES NFR BLD AUTO: 20.2 %
MAN DIFF?: NORMAL
MCHC RBC-ENTMCNC: 29.4 PG
MCHC RBC-ENTMCNC: 31 GM/DL
MCV RBC AUTO: 95 FL
MONOCYTES # BLD AUTO: 0.6 K/UL
MONOCYTES NFR BLD AUTO: 8.7 %
NEUTROPHILS # BLD AUTO: 4.53 K/UL
NEUTROPHILS NFR BLD AUTO: 66 %
NONHDLC SERPL-MCNC: 136 MG/DL
PLATELET # BLD AUTO: 235 K/UL
POTASSIUM SERPL-SCNC: 4.5 MMOL/L
PROT SERPL-MCNC: 6.5 G/DL
RBC # BLD: 4.79 M/UL
RBC # FLD: 14.2 %
SODIUM SERPL-SCNC: 141 MMOL/L
T3 SERPL-MCNC: 120 NG/DL
T3FREE SERPL-MCNC: 2.73 PG/ML
T3RU NFR SERPL: 1.1 TBI
T4 FREE SERPL-MCNC: 1.1 NG/DL
T4 SERPL-MCNC: 7 UG/DL
THYROGLOB AB SERPL-ACNC: <20 IU/ML
THYROPEROXIDASE AB SERPL IA-ACNC: <10 IU/ML
TRIGL SERPL-MCNC: 99 MG/DL
TSH SERPL-ACNC: 3.31 UIU/ML
WBC # FLD AUTO: 6.87 K/UL

## 2021-09-29 ENCOUNTER — APPOINTMENT (OUTPATIENT)
Dept: RADIOLOGY | Facility: CLINIC | Age: 76
End: 2021-09-29
Payer: MEDICARE

## 2021-09-29 ENCOUNTER — OUTPATIENT (OUTPATIENT)
Dept: OUTPATIENT SERVICES | Facility: HOSPITAL | Age: 76
LOS: 1 days | End: 2021-09-29
Payer: MEDICARE

## 2021-09-29 DIAGNOSIS — R29.6 REPEATED FALLS: ICD-10-CM

## 2021-09-29 PROCEDURE — 73130 X-RAY EXAM OF HAND: CPT

## 2021-09-29 PROCEDURE — 73130 X-RAY EXAM OF HAND: CPT | Mod: 26,LT

## 2021-10-12 ENCOUNTER — APPOINTMENT (OUTPATIENT)
Dept: INTERNAL MEDICINE | Facility: CLINIC | Age: 76
End: 2021-10-12
Payer: MEDICARE

## 2021-10-12 VITALS
TEMPERATURE: 210.2 F | WEIGHT: 149 LBS | BODY MASS INDEX: 24.05 KG/M2 | HEART RATE: 83 BPM | DIASTOLIC BLOOD PRESSURE: 80 MMHG | OXYGEN SATURATION: 90 % | SYSTOLIC BLOOD PRESSURE: 120 MMHG

## 2021-10-12 PROCEDURE — 90662 IIV NO PRSV INCREASED AG IM: CPT

## 2021-10-12 PROCEDURE — G0008: CPT

## 2021-10-12 PROCEDURE — 99213 OFFICE O/P EST LOW 20 MIN: CPT | Mod: 25

## 2021-10-12 NOTE — ADDENDUM
[FreeTextEntry1] : I, Mark Euceda, acted solely as scribe for Dr. Emerson Smith DO on this date 10/12/2021 11:20AM .\par \par All medical record entries made by the Scribe were at my, Dr. Emerson Smith DO direction and personally dictated by me on 10/12/2021 11:20AM. I have reviewed the chart and agree that the record accurately reflects my personal performance of the history, physical exam, assessment and plan. I have also personally directed, reviewed and agreed with the chart.\par

## 2021-10-12 NOTE — HEALTH RISK ASSESSMENT
[No] : In the past 12 months have you used drugs other than those required for medical reasons? No [0] : 2) Feeling down, depressed, or hopeless: Not at all (0) [PHQ-2 Negative - No further assessment needed] : PHQ-2 Negative - No further assessment needed [] : No [Audit-CScore] : 0 [de-identified] : Exercise bike for 10 minutes every morning, walks around indoor pool.  [HZF0Jaltd] : 0 [MammogramComments] : Rx previously given for Mammogram and Breast US.  [PapSmearComments] : S/p hysterectomy.  [BoneDensityDate] : 07/19 [BoneDensityComments] : Revealed osteoporosis.

## 2021-10-12 NOTE — HISTORY OF PRESENT ILLNESS
[FreeTextEntry1] : flu vaccine and general follow-up [de-identified] : Patient is a 76 year old female with a past medical history as below who presents for the flu vaccine and general follow-up. Patient states she is taking all medications as prescribed and denies any adverse reactions or side effects. Patient states she has begun exercising (exercise bike for 10 minutes every morning, walks around indoor pool) to strengthen her legs on the recommendation of neurologist, Dr. Lee.

## 2021-10-12 NOTE — PLAN
[FreeTextEntry1] : Immunization\par flu vaccine - Fluzone Quadrivalent High-Dose (65+) 0.7mL x 1 administered intramuscularly to left deltoid \par ENT/Immunology\par seasonal allergies - continue Montelukast Sodium (Singulair) 10mg p.o.q.d. as directed; continue Zyrtec p.o.q.d. as directed\par Cardiology\par hyperlipidemia - continue low cholesterol/low fat diet\par Endocrinology\par hyperglycemia - continue low carbohydrate/low sugar diet \par Gynecology\par Rx previously given for Mammogram and Breast US \par Endocrinology/Musculoskeletal\par osteoporosis - continue Prolia 60mg/mL subcutaneous injections q. 6 months - continue weight-bearing exercises\par Musculoskeletal \par chronic back/neck pain - secondary to spinal stenosis - continue Lyrica (Pregabalin) 75mg TID p.o.q.d. as directed; previously advised to take the third dose of the medication shortly before going to sleep; continue Meloxicam 15mg p.o. with food as directed\par Neurology\par history of MS - continue exercises recommended by neurologist, Dr. Lee\par Psychiatry\par continue Venlafaxine HCl 25mg TID p.o.q.d. as directed \par Urology\par chronic UTIs - continue Methenamine Hippurate 1 GM p.o.q.d. as directed and OTC Cranberry 405mg BID p.o.q.d.\par incontinence/overactive bladder - continue Trospium Chloride ER 60mg p.o.q.d. as directed

## 2021-10-12 NOTE — ASSESSMENT
[FreeTextEntry1] : Patient is a 76 year old female with a past medical history as above who presents for the flu vaccine and general follow-up.

## 2021-11-08 ENCOUNTER — APPOINTMENT (OUTPATIENT)
Dept: DERMATOLOGY | Facility: CLINIC | Age: 76
End: 2021-11-08
Payer: MEDICARE

## 2021-11-08 VITALS — BODY MASS INDEX: 24.66 KG/M2 | HEIGHT: 65 IN | WEIGHT: 148 LBS

## 2021-11-08 DIAGNOSIS — L91.8 OTHER HYPERTROPHIC DISORDERS OF THE SKIN: ICD-10-CM

## 2021-11-08 DIAGNOSIS — D22.9 MELANOCYTIC NEVI, UNSPECIFIED: ICD-10-CM

## 2021-11-08 DIAGNOSIS — L65.0 TELOGEN EFFLUVIUM: ICD-10-CM

## 2021-11-08 DIAGNOSIS — L40.9 PSORIASIS, UNSPECIFIED: ICD-10-CM

## 2021-11-08 DIAGNOSIS — Z12.83 ENCOUNTER FOR SCREENING FOR MALIGNANT NEOPLASM OF SKIN: ICD-10-CM

## 2021-11-08 PROCEDURE — 99204 OFFICE O/P NEW MOD 45 MIN: CPT | Mod: 25

## 2021-11-08 PROCEDURE — 17110 DESTRUCTION B9 LES UP TO 14: CPT

## 2021-11-10 ENCOUNTER — APPOINTMENT (OUTPATIENT)
Dept: INTERNAL MEDICINE | Facility: CLINIC | Age: 76
End: 2021-11-10
Payer: MEDICARE

## 2021-11-10 ENCOUNTER — RX RENEWAL (OUTPATIENT)
Age: 76
End: 2021-11-10

## 2021-11-10 DIAGNOSIS — S01.01XA LACERATION W/OUT FOREIGN BODY OF SCALP, INITIAL ENCOUNTER: ICD-10-CM

## 2021-11-10 PROCEDURE — 99214 OFFICE O/P EST MOD 30 MIN: CPT

## 2021-11-12 PROBLEM — S01.01XA SCALP LACERATION, INITIAL ENCOUNTER: Status: ACTIVE | Noted: 2021-11-12

## 2021-11-12 NOTE — ADDENDUM
[FreeTextEntry1] : I, Mark Euceda, acted solely as scribe for Dr. Emerson Smith DO on this date 11/10/2021  1:00PM .\par \par All medical record entries made by the Scribe were at my, Dr. Emerson Smith DO direction and personally dictated by me on 11/10/2021  1:00PM. I have reviewed the chart and agree that the record accurately reflects my personal performance of the history, physical exam, assessment and plan. I have also personally directed, reviewed and agreed with the chart.\par

## 2021-11-12 NOTE — ASSESSMENT
[FreeTextEntry1] : Patient is a 76 year old female with a past medical history as above who presents for 3 mm laceration of scalp (left parietal area) secondary to recent fall.

## 2021-11-12 NOTE — HEALTH RISK ASSESSMENT
[No] : In the past 12 months have you used drugs other than those required for medical reasons? No [0] : 2) Feeling down, depressed, or hopeless: Not at all (0) [PHQ-2 Negative - No further assessment needed] : PHQ-2 Negative - No further assessment needed [Fully functional (bathing, dressing, toileting, transferring, walking, feeding)] : Fully functional (bathing, dressing, toileting, transferring, walking, feeding) [Fully functional (using the telephone, shopping, preparing meals, housekeeping, doing laundry, using] : Fully functional and needs no help or supervision to perform IADLs (using the telephone, shopping, preparing meals, housekeeping, doing laundry, using transportation, managing medications and managing finances) [] : No [Audit-CScore] : 0 [de-identified] : Exercise bike for 10 minutes every morning, walks around indoor pool.  [NTI8Vredz] : 0 [MammogramComments] : Rx previously given for Mammogram and Breast US.  [PapSmearComments] : S/p hysterectomy.  [BoneDensityDate] : 07/19 [BoneDensityComments] : Revealed osteoporosis.

## 2021-11-12 NOTE — PHYSICAL EXAM
[No Acute Distress] : no acute distress [Well Nourished] : well nourished [Well Developed] : well developed [Well-Appearing] : well-appearing [Normal Sclera/Conjunctiva] : normal sclera/conjunctiva [PERRL] : pupils equal round and reactive to light [EOMI] : extraocular movements intact [Normal Outer Ear/Nose] : the outer ears and nose were normal in appearance [Normal Oropharynx] : the oropharynx was normal [No JVD] : no jugular venous distention [No Lymphadenopathy] : no lymphadenopathy [Supple] : supple [Thyroid Normal, No Nodules] : the thyroid was normal and there were no nodules present [No Respiratory Distress] : no respiratory distress  [No Accessory Muscle Use] : no accessory muscle use [Clear to Auscultation] : lungs were clear to auscultation bilaterally [Normal Rate] : normal rate  [Regular Rhythm] : with a regular rhythm [Normal S1, S2] : normal S1 and S2 [No Murmur] : no murmur heard [No Carotid Bruits] : no carotid bruits [No Abdominal Bruit] : a ~M bruit was not heard ~T in the abdomen [No Varicosities] : no varicosities [Pedal Pulses Present] : the pedal pulses are present [No Edema] : there was no peripheral edema [No Palpable Aorta] : no palpable aorta [No Extremity Clubbing/Cyanosis] : no extremity clubbing/cyanosis [Soft] : abdomen soft [Non Tender] : non-tender [Non-distended] : non-distended [No Masses] : no abdominal mass palpated [No HSM] : no HSM [Normal Posterior Cervical Nodes] : no posterior cervical lymphadenopathy [Normal Bowel Sounds] : normal bowel sounds [Normal Anterior Cervical Nodes] : no anterior cervical lymphadenopathy [No CVA Tenderness] : no CVA  tenderness [No Spinal Tenderness] : no spinal tenderness [No Joint Swelling] : no joint swelling [Grossly Normal Strength/Tone] : grossly normal strength/tone [No Rash] : no rash [Coordination Grossly Intact] : coordination grossly intact [No Focal Deficits] : no focal deficits [Normal Gait] : normal gait [Deep Tendon Reflexes (DTR)] : deep tendon reflexes were 2+ and symmetric [Normal Affect] : the affect was normal [Normal Insight/Judgement] : insight and judgment were intact [de-identified] : 3 mm laceration of scalp (left parietal area)

## 2021-11-12 NOTE — PLAN
[FreeTextEntry1] : Integumentary\par 3 mm laceration of scalp (left parietal area) - secondary to recent fall - bleeding has stopped; area was cleaned with alcohol; topical Bacitracin was applied; advised patient to continue applying thin film of Bacitracin to the area BID q.d. for 10 days as directed; advised to follow up if any new issues arise\par psoriasis of scalp - continue Clobetasol Propionate 0.05% External Solution: apply to affected area BID q.d. as directed - continue to follow up with dermatology \par Musculoskeletal \par gait instability - Rx given for PT; recommended following up at [facility]; advised caution when ambulating \par chronic back/neck pain - secondary to spinal stenosis - continue Lyrica (Pregabalin) 75mg TID p.o.q.d. as directed; previously advised to take the third dose of the medication shortly before going to sleep; continue Meloxicam 15mg p.o. with food as directed\par Neurology\par history of MS - continue exercises recommended by neurologist, Dr. Lee\par ENT/Immunology\par seasonal allergies - continue Montelukast Sodium (Singulair) 10mg p.o.q.d. as directed; continue Zyrtec p.o.q.d. as directed\par Cardiology\par hyperlipidemia - continue low cholesterol/low fat diet\par Endocrinology\par hyperglycemia - continue low carbohydrate/low sugar diet \par Gynecology\par Rx previously given for Mammogram and Breast US \par Endocrinology/Musculoskeletal\par osteoporosis - continue Prolia 60mg/mL subcutaneous injections q. 6 months - continue weight-bearing exercises\par Psychiatry\par Continue Venlafaxine HCl 25mg TID p.o.q.d. as directed \par Urology\par chronic UTIs - continue Methenamine Hippurate 1 GM p.o.q.d. as directed and OTC Cranberry 405mg BID p.o.q.d.\par incontinence/overactive bladder - continue Trospium Chloride ER 60mg p.o.q.d. as directed

## 2021-11-12 NOTE — HISTORY OF PRESENT ILLNESS
[Family Member] : family member [FreeTextEntry8] : Patient is a 76 year old female with a past medical history as below who presents for 3 mm laceration of the scalp (left parietal region) with bleeding secondary to a fall earlier today. She states she lost her footing and the fall caused her to hit her head against a parked car. Patient denies experiencing chest pain, palpitations, or dizziness prior to the fall. Patient notes history of MS and gait instability. She has been attending PT at the office of her neurologist, Dr. Lee. She inquires about PT facilities closer to her home.\par

## 2021-11-17 ENCOUNTER — APPOINTMENT (OUTPATIENT)
Dept: INTERNAL MEDICINE | Facility: CLINIC | Age: 76
End: 2021-11-17
Payer: MEDICARE

## 2021-11-17 VITALS
HEART RATE: 84 BPM | HEIGHT: 65 IN | RESPIRATION RATE: 16 BRPM | TEMPERATURE: 97.3 F | SYSTOLIC BLOOD PRESSURE: 130 MMHG | WEIGHT: 153 LBS | BODY MASS INDEX: 25.49 KG/M2 | DIASTOLIC BLOOD PRESSURE: 84 MMHG | OXYGEN SATURATION: 96 %

## 2021-11-17 DIAGNOSIS — R29.6 REPEATED FALLS: ICD-10-CM

## 2021-11-17 PROCEDURE — 99214 OFFICE O/P EST MOD 30 MIN: CPT | Mod: 25

## 2021-11-17 PROCEDURE — 96372 THER/PROPH/DIAG INJ SC/IM: CPT

## 2021-11-17 NOTE — HEALTH RISK ASSESSMENT
[No] : In the past 12 months have you used drugs other than those required for medical reasons? No [0] : 2) Feeling down, depressed, or hopeless: Not at all (0) [PHQ-2 Negative - No further assessment needed] : PHQ-2 Negative - No further assessment needed [] : No [Audit-CScore] : 0 [de-identified] : Exercise bike for 10 minutes every morning, walks around indoor pool.  [[id number]] : 0 [MammogramComments] : Rx previously given for Mammogram and Breast US.  [PapSmearComments] : S/p hysterectomy.  [BoneDensityDate] : 07/19 [BoneDensityComments] : Revealed osteoporosis.

## 2021-11-17 NOTE — ASSESSMENT
[FreeTextEntry1] : Patient is a 76 year old female with a past medical history as above who presents for B12 injection and general follow-up.

## 2021-11-17 NOTE — PLAN
[FreeTextEntry1] : Endocrinology\par Vitamin B12 deficiency - Vitamin B12 1000mcg x 1 administered subcutaneously to the right deltoid; continue OTC Vitamin B12 p.o.q.d. as directed\par hyperglycemia - continue low carbohydrate/low sugar diet \par Musculoskeletal \par chronic back/neck pain - secondary to spinal stenosis - continue Lyrica (Pregabalin) 75mg TID p.o.q.d. as directed; previously advised to take the third dose of the medication shortly before going to sleep; continue Meloxicam 15mg p.o. with food as directed\par gait instability - continue PT at [facility]; advised caution when ambulating \par Neurology\par history of MS - continue exercises recommended by neurologist, Dr. Lee\par ENT/Immunology\par seasonal allergies - continue Montelukast Sodium (Singulair) 10mg p.o.q.d. as directed; continue Zyrtec p.o. as directed\par Cardiology\par hyperlipidemia - continue low cholesterol/low fat diet\par Gynecology\par Rx previously given for Mammogram and Breast US \par Endocrinology/Musculoskeletal\par osteoporosis - continue Prolia 60mg/mL subcutaneous injections q. 6 months; continue weight-bearing exercises\par Integumentary\par psoriasis of scalp - continue Clobetasol Propionate 0.05% External Solution: apply to affected area BID q.d. as directed - continue to follow up with dermatology \par Psychiatry\par Continue Venlafaxine HCl 25mg TID p.o.q.d. as directed \par Urology\par chronic UTIs - continue Methenamine Hippurate 1 GM p.o.q.d. as directed and OTC Cranberry 405mg BID p.o.q.d. as directed\par incontinence/overactive bladder - continue Trospium Chloride ER 60mg p.o.q.d. as directed\par \par RTO in 1 month for next B12 injection; fasting blood work to be done in 1-2 months.

## 2021-11-17 NOTE — ADDENDUM
[FreeTextEntry1] : I, Mark Euceda, acted solely as scribe for Dr. Emerson Smith DO on this date 11/17/2021 12:40PM .\par \par All medical record entries made by the Scribe were at my, Dr. Emerson Smith DO direction and personally dictated by me on 11/17/2021 12:40PM. I have reviewed the chart and agree that the record accurately reflects my personal performance of the history, physical exam, assessment and plan. I have also personally directed, reviewed and agreed with the chart.\par

## 2021-11-17 NOTE — HISTORY OF PRESENT ILLNESS
[FreeTextEntry1] : B12 injection and general follow-up [de-identified] : Patient is a 76 year old female with a past medical history as below who presents for B12 injection and general follow-up. Prior blood work ordered by neurologist, Dr. Lee revealed low Vitamin B12. Dr. Lee recommended starting monthly B12 injections and taking OTC Vitamin B12 daily. Patient has been attending PT at [facility]. 3 mm laceration of scalp (left parietal area) secondary to recent fall is healing well. Patient denies any falls since her last visit. Patient states she is taking all medications as prescribed and denies any adverse reactions or side effects. She notes less frequent UTIs since starting Methenamine Hippurate and OTC Cranberry capsules.

## 2021-12-11 ENCOUNTER — RX RENEWAL (OUTPATIENT)
Age: 76
End: 2021-12-11

## 2021-12-13 ENCOUNTER — APPOINTMENT (OUTPATIENT)
Dept: INTERNAL MEDICINE | Facility: CLINIC | Age: 76
End: 2021-12-13

## 2022-01-13 ENCOUNTER — APPOINTMENT (OUTPATIENT)
Dept: INTERNAL MEDICINE | Facility: CLINIC | Age: 77
End: 2022-01-13
Payer: MEDICARE

## 2022-01-13 VITALS
OXYGEN SATURATION: 96 % | RESPIRATION RATE: 17 BRPM | WEIGHT: 153 LBS | HEART RATE: 84 BPM | BODY MASS INDEX: 25.49 KG/M2 | HEIGHT: 65 IN | TEMPERATURE: 97.4 F

## 2022-01-13 PROCEDURE — 99213 OFFICE O/P EST LOW 20 MIN: CPT

## 2022-01-13 NOTE — ADDENDUM
[FreeTextEntry1] : I, Mark Euceda, acted solely as scribe for Dr. Emerson Smith DO on this date 01/13/2022  9:20AM .\par \par All medical record entries made by the Scribe were at my, Dr. Emerson Smith DO direction and personally dictated by me on 01/13/2022  9:20AM. I have reviewed the chart and agree that the record accurately reflects my personal performance of the history, physical exam, assessment and plan. I have also personally directed, reviewed and agreed with the chart.\par

## 2022-01-13 NOTE — ASSESSMENT
[FreeTextEntry1] : Patient is a 76 year old female with a past medical history as above who presents for her Prolia injection.

## 2022-01-13 NOTE — PLAN
[FreeTextEntry1] : Endocrinology/Musculoskeletal\par osteoporosis - Prolia 60mg/mL administered subcutaneously to left triceps area; continue weight-bearing exercises\par ENT/Immunology\par seasonal allergies - continue Montelukast Sodium (Singulair) 10mg p.o.q.d. as directed; continue Zyrtec p.o. as directed\par Cardiology\par hyperlipidemia - continue low cholesterol/low fat diet\par Endocrinology\par hyperglycemia - continue low carbohydrate/low sugar diet \par Vitamin B12 deficiency - continue OTC Vitamin B12 p.o.q.d. as directed\par Gynecology\par Rx previously given for Mammogram and Breast US \par Musculoskeletal \par chronic back/neck pain - secondary to spinal stenosis - continue Lyrica (Pregabalin) 75mg TID p.o.q.d. as directed; previously advised to take the third dose of the medication shortly before going to sleep; continue Meloxicam 15mg p.o. with food as directed\par gait instability - continue PT at [facility]; advised caution when ambulating \par Neurology\par history of MS - continue exercises recommended by neurologist, Dr. Lee\par Integumentary\par psoriasis of scalp - continue Clobetasol Propionate 0.05% External Solution: apply to affected area BID q.d. as directed - continue to follow up with dermatology \par Psychiatry\par Continue Venlafaxine HCl 25mg TID p.o.q.d. as directed \par Urology\par chronic UTIs - continue Methenamine Hippurate 1 GM p.o.q.d. as directed and OTC Cranberry 405mg BID p.o.q.d.\par incontinence/overactive bladder - continue Trospium Chloride ER 60mg p.o.q.d. as directed\par \par Patient to follow up for fasting blood work.

## 2022-01-13 NOTE — HEALTH RISK ASSESSMENT
[Never] : Never [No] : In the past 12 months have you used drugs other than those required for medical reasons? No [0] : 2) Feeling down, depressed, or hopeless: Not at all (0) [PHQ-2 Negative - No further assessment needed] : PHQ-2 Negative - No further assessment needed [Fully functional (bathing, dressing, toileting, transferring, walking, feeding)] : Fully functional (bathing, dressing, toileting, transferring, walking, feeding) [Fully functional (using the telephone, shopping, preparing meals, housekeeping, doing laundry, using] : Fully functional and needs no help or supervision to perform IADLs (using the telephone, shopping, preparing meals, housekeeping, doing laundry, using transportation, managing medications and managing finances) [Audit-CScore] : 0 [de-identified] : Exercise bike for 10 minutes every morning, walks around indoor pool.  [[id number]] : 0 [MammogramComments] : Rx previously given for Mammogram and Breast US.  [PapSmearComments] : S/p hysterectomy.  [BoneDensityDate] : 07/19 [BoneDensityComments] : Revealed osteoporosis.

## 2022-01-13 NOTE — HISTORY OF PRESENT ILLNESS
[FreeTextEntry1] : Prolia injection [de-identified] : Patient is a 76 year old female with a past medical history as below who presents for her Prolia injection. Patient states she is taking all medications as prescribed and denies any adverse reactions or side effects. She notes less frequent UTIs since starting Methenamine Hippurate and OTC Cranberry capsules. Patient feels well overall with no new complaints. \par

## 2022-02-01 ENCOUNTER — APPOINTMENT (OUTPATIENT)
Dept: INTERNAL MEDICINE | Facility: CLINIC | Age: 77
End: 2022-02-01
Payer: MEDICARE

## 2022-02-01 ENCOUNTER — LABORATORY RESULT (OUTPATIENT)
Age: 77
End: 2022-02-01

## 2022-02-01 VITALS
WEIGHT: 153 LBS | TEMPERATURE: 98.1 F | BODY MASS INDEX: 25.49 KG/M2 | OXYGEN SATURATION: 96 % | SYSTOLIC BLOOD PRESSURE: 128 MMHG | HEART RATE: 84 BPM | DIASTOLIC BLOOD PRESSURE: 78 MMHG | HEIGHT: 65 IN | RESPIRATION RATE: 17 BRPM

## 2022-02-01 PROCEDURE — 36415 COLL VENOUS BLD VENIPUNCTURE: CPT

## 2022-02-01 PROCEDURE — 99214 OFFICE O/P EST MOD 30 MIN: CPT | Mod: 25

## 2022-02-01 NOTE — PLAN
[FreeTextEntry1] : ENT/Immunology\par seasonal allergies - continue Montelukast Sodium (Singulair) 10mg p.o.q.d. as directed; continue Zyrtec p.o. as directed\par Cardiology\par hyperlipidemia - continue low cholesterol/low fat diet - check FLP\par Endocrinology\par hyperglycemia - continue low carbohydrate/low sugar diet - check hemoglobin A1C\par Vitamin B12 deficiency - continue OTC Vitamin B12 p.o.q.d. as directed - check Serum Vitamin B12\par Gynecology\par Follow up with GYN; Rx given for Mammogram and Breast US \par Endocrinology/Musculoskeletal\par osteoporosis - continue Prolia 60mg/mL subcutaneous injections q. 6 months; continue weight-bearing exercises, check calcium level\par Musculoskeletal \par chronic back/neck pain - secondary to spinal stenosis - continue Lyrica (Pregabalin) 75mg TID p.o.q.d. as directed; previously advised to take the third dose of the medication shortly before going to sleep; continue Meloxicam 15mg p.o. with food as directed\par gait instability - continue PT at [facility]: Rx given; previously advised caution when ambulating \par right shoulder pain - likely secondary to biceps tendonitis given frequent knitting - recommended applying Voltaren Gel to affected area p.r.n. as directed; advised to stop knitting for 1-2 weeks\par Neurology\par history of MS - continue exercises recommended by neurologist, Dr. Lee\par Integumentary\par psoriasis of scalp - continue Clobetasol Propionate 0.05% External Solution: apply to affected area BID q.d. as directed - continue to follow up with dermatology \par Psychiatry\par Continue Venlafaxine HCl 25mg TID p.o.q.d. as directed \par Urology\par chronic UTIs - continue Methenamine Hippurate 1 GM p.o.q.d. as directed and OTC Cranberry 405mg BID p.o.q.d.\par incontinence/overactive bladder - continue Trospium Chloride ER 60mg p.o.q.d. as directed\par \par check female panel, hemoglobin A1C, thyroid panel, and Serum Vitamin B12

## 2022-02-01 NOTE — ADDENDUM
[FreeTextEntry1] : I, Mark Euceda, acted solely as scribe for Dr. Emerson Smith DO on this date 02/01/2022  9:40AM .\par \par All medical record entries made by the Scribe were at my, Dr. Emerson Smith DO direction and personally dictated by me on 02/01/2022  9:40AM. I have reviewed the chart and agree that the record accurately reflects my personal performance of the history, physical exam, assessment and plan. I have also personally directed, reviewed and agreed with the chart.\par

## 2022-02-01 NOTE — HEALTH RISK ASSESSMENT
[Never] : Never [No] : In the past 12 months have you used drugs other than those required for medical reasons? No [0] : 2) Feeling down, depressed, or hopeless: Not at all (0) [PHQ-2 Negative - No further assessment needed] : PHQ-2 Negative - No further assessment needed [Fully functional (bathing, dressing, toileting, transferring, walking, feeding)] : Fully functional (bathing, dressing, toileting, transferring, walking, feeding) [Fully functional (using the telephone, shopping, preparing meals, housekeeping, doing laundry, using] : Fully functional and needs no help or supervision to perform IADLs (using the telephone, shopping, preparing meals, housekeeping, doing laundry, using transportation, managing medications and managing finances) [Audit-CScore] : 0 [de-identified] : Exercise bike for 10 minutes every morning, walks around indoor pool.  [[id number]] : 0 [MammogramComments] : Rx given for Mammogram and Breast US.  [PapSmearComments] : S/p hysterectomy.  [BoneDensityDate] : 07/19 [BoneDensityComments] : Revealed osteoporosis.

## 2022-02-01 NOTE — HISTORY OF PRESENT ILLNESS
[FreeTextEntry1] : fasting blood work and general follow-up\par  [de-identified] : Patient is a 76 year old female with a past medical history as below who presents for fasting blood work and general follow-up. Patient states she is taking all medications as prescribed and denies any adverse reactions or side effects. She notes less frequent UTIs since starting Methenamine Hippurate and OTC Cranberry capsules. Patient has not seen her GYN in the past year. She is due for annual breast imaging. Patient had noted intermittent left shoulder pain over the course of the past 10 days. She states the left shoulder pain has resolved, but now notes right shoulder pain. She has noted an increasingly sedentary lifestyle secondary to the colder weather and states she has been frequently knitting at home.

## 2022-02-01 NOTE — PHYSICAL EXAM
[No Acute Distress] : no acute distress [Well Nourished] : well nourished [Well Developed] : well developed [Well-Appearing] : well-appearing [Normal Sclera/Conjunctiva] : normal sclera/conjunctiva [PERRL] : pupils equal round and reactive to light [EOMI] : extraocular movements intact [Normal Outer Ear/Nose] : the outer ears and nose were normal in appearance [Normal Oropharynx] : the oropharynx was normal [No JVD] : no jugular venous distention [No Lymphadenopathy] : no lymphadenopathy [Supple] : supple [Thyroid Normal, No Nodules] : the thyroid was normal and there were no nodules present [No Respiratory Distress] : no respiratory distress  [No Accessory Muscle Use] : no accessory muscle use [Clear to Auscultation] : lungs were clear to auscultation bilaterally [Normal Rate] : normal rate  [Regular Rhythm] : with a regular rhythm [Normal S1, S2] : normal S1 and S2 [No Murmur] : no murmur heard [No Carotid Bruits] : no carotid bruits [No Abdominal Bruit] : a ~M bruit was not heard ~T in the abdomen [No Varicosities] : no varicosities [Pedal Pulses Present] : the pedal pulses are present [No Edema] : there was no peripheral edema [No Palpable Aorta] : no palpable aorta [No Extremity Clubbing/Cyanosis] : no extremity clubbing/cyanosis [Soft] : abdomen soft [Non Tender] : non-tender [Non-distended] : non-distended [No Masses] : no abdominal mass palpated [No HSM] : no HSM [Normal Bowel Sounds] : normal bowel sounds [Normal Posterior Cervical Nodes] : no posterior cervical lymphadenopathy [Normal Anterior Cervical Nodes] : no anterior cervical lymphadenopathy [No CVA Tenderness] : no CVA  tenderness [No Spinal Tenderness] : no spinal tenderness [No Joint Swelling] : no joint swelling [No Rash] : no rash [Coordination Grossly Intact] : coordination grossly intact [No Focal Deficits] : no focal deficits [Normal Gait] : normal gait [Deep Tendon Reflexes (DTR)] : deep tendon reflexes were 2+ and symmetric [Normal Affect] : the affect was normal [Normal Insight/Judgement] : insight and judgment were intact [de-identified] : tenderness at area of right bicipital groove

## 2022-02-03 ENCOUNTER — NON-APPOINTMENT (OUTPATIENT)
Age: 77
End: 2022-02-03

## 2022-02-03 LAB
25(OH)D3 SERPL-MCNC: 33.3 NG/ML
ALBUMIN SERPL ELPH-MCNC: 4.3 G/DL
ALP BLD-CCNC: 55 U/L
ALT SERPL-CCNC: 13 U/L
ANION GAP SERPL CALC-SCNC: 10 MMOL/L
AST SERPL-CCNC: 13 U/L
BASOPHILS # BLD AUTO: 0.02 K/UL
BASOPHILS NFR BLD AUTO: 0.4 %
BILIRUB SERPL-MCNC: 0.3 MG/DL
BUN SERPL-MCNC: 16 MG/DL
CALCIUM SERPL-MCNC: 9.3 MG/DL
CHLORIDE SERPL-SCNC: 105 MMOL/L
CHOLEST SERPL-MCNC: 213 MG/DL
CO2 SERPL-SCNC: 28 MMOL/L
CREAT SERPL-MCNC: 0.73 MG/DL
EOSINOPHIL # BLD AUTO: 0.22 K/UL
EOSINOPHIL NFR BLD AUTO: 4.2 %
ESTIMATED AVERAGE GLUCOSE: 126 MG/DL
GLUCOSE SERPL-MCNC: 112 MG/DL
HBA1C MFR BLD HPLC: 6 %
HCT VFR BLD CALC: 44.2 %
HDLC SERPL-MCNC: 65 MG/DL
HGB BLD-MCNC: 13.4 G/DL
IMM GRANULOCYTES NFR BLD AUTO: 0.4 %
LDLC SERPL CALC-MCNC: 112 MG/DL
LYMPHOCYTES # BLD AUTO: 1.1 K/UL
LYMPHOCYTES NFR BLD AUTO: 21.1 %
MAN DIFF?: NORMAL
MCHC RBC-ENTMCNC: 28.8 PG
MCHC RBC-ENTMCNC: 30.3 GM/DL
MCV RBC AUTO: 95.1 FL
MONOCYTES # BLD AUTO: 0.55 K/UL
MONOCYTES NFR BLD AUTO: 10.5 %
NEUTROPHILS # BLD AUTO: 3.31 K/UL
NEUTROPHILS NFR BLD AUTO: 63.4 %
NONHDLC SERPL-MCNC: 147 MG/DL
PLATELET # BLD AUTO: 218 K/UL
POTASSIUM SERPL-SCNC: 5.1 MMOL/L
PROT SERPL-MCNC: 6.8 G/DL
RBC # BLD: 4.65 M/UL
RBC # FLD: 14.4 %
SODIUM SERPL-SCNC: 143 MMOL/L
T3 SERPL-MCNC: 121 NG/DL
T3FREE SERPL-MCNC: 3.24 PG/ML
T3RU NFR SERPL: 1.1 TBI
T4 FREE SERPL-MCNC: 1.2 NG/DL
T4 SERPL-MCNC: 6.7 UG/DL
THYROGLOB AB SERPL-ACNC: <20 IU/ML
THYROPEROXIDASE AB SERPL IA-ACNC: 12.4 IU/ML
TRIGL SERPL-MCNC: 178 MG/DL
TSH SERPL-ACNC: 3.87 UIU/ML
VIT B12 SERPL-MCNC: 753 PG/ML
WBC # FLD AUTO: 5.22 K/UL

## 2022-02-27 ENCOUNTER — RX RENEWAL (OUTPATIENT)
Age: 77
End: 2022-02-27

## 2022-02-27 RX ORDER — OXYCODONE AND ACETAMINOPHEN 10; 325 MG/1; MG/1
10-325 TABLET ORAL
Qty: 90 | Refills: 0 | Status: DISCONTINUED | COMMUNITY
Start: 2021-07-29 | End: 2022-02-27

## 2022-02-27 RX ORDER — METHYLPREDNISOLONE 4 MG/1
4 TABLET ORAL
Qty: 1 | Refills: 1 | Status: DISCONTINUED | COMMUNITY
Start: 2021-09-18 | End: 2022-02-27

## 2022-02-27 RX ORDER — MELOXICAM 15 MG/1
15 TABLET ORAL
Qty: 30 | Refills: 0 | Status: DISCONTINUED | COMMUNITY
Start: 2020-11-02 | End: 2022-02-27

## 2022-03-01 ENCOUNTER — APPOINTMENT (OUTPATIENT)
Dept: INTERNAL MEDICINE | Facility: CLINIC | Age: 77
End: 2022-03-01
Payer: MEDICARE

## 2022-03-01 VITALS
WEIGHT: 150 LBS | HEART RATE: 96 BPM | HEIGHT: 65 IN | SYSTOLIC BLOOD PRESSURE: 116 MMHG | DIASTOLIC BLOOD PRESSURE: 72 MMHG | BODY MASS INDEX: 24.99 KG/M2

## 2022-03-01 LAB — GLUCOSE BLDC GLUCOMTR-MCNC: 102

## 2022-03-01 PROCEDURE — 82962 GLUCOSE BLOOD TEST: CPT

## 2022-03-01 PROCEDURE — 99214 OFFICE O/P EST MOD 30 MIN: CPT | Mod: 25

## 2022-03-01 NOTE — HISTORY OF PRESENT ILLNESS
[FreeTextEntry1] : Rx refill and general follow-up [de-identified] : Patient is a 76 year old female with a past medical history as below who presents for Rx refill and general follow-up. Patient states she is taking all medications as prescribed and denies any adverse reactions or side effects. She denies any gastrointestinal issues on Metformin. She notes less frequent UTIs since starting Methenamine Hippurate and OTC Cranberry capsules. Patient noted an episode of generalized weakness approximately 4 days ago. She noted difficulty getting out of bed which resulted in urinary incontinence. She believes the episode of weakness may have been secondary to the cold weather. She currently feels well, but has scheduled a follow-up appointment with neurologist, Dr. Lee. Patient has been maintaining a well-balanced, low carbohydrate diet. She has been going to the gym regularly over the course of the past week; 15 minutes per day. Patient requests Rx refill for Lyrica (Pregabalin) which she states is helping with chronic back and neck pain secondary to spinal stenosis.

## 2022-03-01 NOTE — ADDENDUM
[FreeTextEntry1] : I, Mark Euceda, acted solely as scribe for Dr. Emerson Smith DO on this date 03/01/2022  9:40AM .\par \par All medical record entries made by the Scribe were at my, Dr. Emerson Smith DO direction and personally dictated by me on 03/01/2022  9:40AM. I have reviewed the chart and agree that the record accurately reflects my personal performance of the history, physical exam, assessment and plan. I have also personally directed, reviewed and agreed with the chart.\par

## 2022-03-01 NOTE — REVIEW OF SYSTEMS
[Incontinence] : incontinence [Negative] : Neurological [FreeTextEntry2] : episode of general weakness

## 2022-03-01 NOTE — PLAN
[FreeTextEntry1] : Musculoskeletal \par chronic back/neck pain - secondary to spinal stenosis - continue Lyrica (Pregabalin) 75mg TID p.o.q.d. as directed, Rx filled,  reviewed; previously advised to take the third dose of the medication shortly before going to sleep; continue Meloxicam 15mg p.o. p.r.n. with food as directed \par gait instability - continue PT at [facility]; previously advised caution when ambulating \par Neurology\par episode of weakness - follow up with neurologist, Dr. Lee for further evaluation/testing \par history of MS - continue exercises recommended by Dr. Lee\par ENT/Immunology\par seasonal allergies - continue Montelukast Sodium (Singulair) 10mg p.o.q.d. as directed; continue Zyrtec p.o. as directed\par Cardiology\par hyperlipidemia - continue low cholesterol/low fat diet \par Endocrinology\par hyperglycemia (prediabetes) - continue Metformin HCl ER 500mg p.o.q.d. as directed - continue low carbohydrate/low sugar diet - check POCT Glucose Finger Stick: 102\par Vitamin B12 deficiency - continue OTC Vitamin B12 1000mcg p.o.q.d. as directed \par history of Vitamin D insufficiency - continue Vitamin D-3 1000 IU p.o.q.d. with a meal as directed \par Gynecology\par Previously advised to follow up with GYN; Rx previously given for Mammogram and Breast US \par Endocrinology/Musculoskeletal\par osteoporosis - continue Prolia 60mg/mL subcutaneous injections q. 6 months; continue Vitamin D-3 1000 IU p.o.q.d. with a meal as directed; continue weight-bearing exercises\par Integumentary\par psoriasis of scalp - continue Clobetasol Propionate 0.05% External Solution: apply to affected area BID q.d. as directed - continue to follow up with dermatology \par Psychiatry\par Continue Venlafaxine HCl 25mg TID p.o.q.d. as directed \par Urology\par chronic UTIs - continue Methenamine Hippurate 1 GM p.o.q.d. as directed and OTC Cranberry 405mg BID p.o.q.d.\par incontinence/overactive bladder - continue Trospium Chloride ER 60mg p.o.q.d. as directed

## 2022-03-01 NOTE — ASSESSMENT
[FreeTextEntry1] : Patient is a 76 year old female with a past medical history as above who presents for Rx refill and general follow-up.

## 2022-03-01 NOTE — HEALTH RISK ASSESSMENT
[Never] : Never [No] : In the past 12 months have you used drugs other than those required for medical reasons? No [0] : 2) Feeling down, depressed, or hopeless: Not at all (0) [PHQ-2 Negative - No further assessment needed] : PHQ-2 Negative - No further assessment needed [Fully functional (bathing, dressing, toileting, transferring, walking, feeding)] : Fully functional (bathing, dressing, toileting, transferring, walking, feeding) [Fully functional (using the telephone, shopping, preparing meals, housekeeping, doing laundry, using] : Fully functional and needs no help or supervision to perform IADLs (using the telephone, shopping, preparing meals, housekeeping, doing laundry, using transportation, managing medications and managing finances) [Audit-CScore] : 0 [de-identified] : Exercise bike for 10 minutes every morning, walks around indoor pool.  [[id number]] : 0 [MammogramComments] : Rx previously given for Mammogram and Breast US.  [PapSmearComments] : S/p hysterectomy.  [BoneDensityDate] : 07/19 [BoneDensityComments] : Revealed osteoporosis.

## 2022-03-18 ENCOUNTER — APPOINTMENT (OUTPATIENT)
Dept: MAMMOGRAPHY | Facility: IMAGING CENTER | Age: 77
End: 2022-03-18

## 2022-03-18 ENCOUNTER — APPOINTMENT (OUTPATIENT)
Dept: ULTRASOUND IMAGING | Facility: IMAGING CENTER | Age: 77
End: 2022-03-18

## 2022-03-22 ENCOUNTER — RX RENEWAL (OUTPATIENT)
Age: 77
End: 2022-03-22

## 2022-03-31 ENCOUNTER — APPOINTMENT (OUTPATIENT)
Dept: DERMATOLOGY | Facility: CLINIC | Age: 77
End: 2022-03-31
Payer: MEDICARE

## 2022-03-31 DIAGNOSIS — L71.9 ROSACEA, UNSPECIFIED: ICD-10-CM

## 2022-03-31 PROCEDURE — 99214 OFFICE O/P EST MOD 30 MIN: CPT

## 2022-04-06 RX ORDER — OXYCODONE AND ACETAMINOPHEN 10; 325 MG/1; MG/1
10-325 TABLET ORAL
Qty: 90 | Refills: 0 | Status: DISCONTINUED | COMMUNITY
Start: 2022-03-29 | End: 2022-04-06

## 2022-04-06 RX ORDER — MELOXICAM 15 MG/1
15 TABLET ORAL
Qty: 30 | Refills: 0 | Status: DISCONTINUED | COMMUNITY
Start: 2022-03-29 | End: 2022-04-06

## 2022-04-07 ENCOUNTER — APPOINTMENT (OUTPATIENT)
Dept: INTERNAL MEDICINE | Facility: CLINIC | Age: 77
End: 2022-04-07
Payer: MEDICARE

## 2022-04-07 VITALS
OXYGEN SATURATION: 97 % | HEIGHT: 65 IN | SYSTOLIC BLOOD PRESSURE: 122 MMHG | HEART RATE: 89 BPM | TEMPERATURE: 97.9 F | DIASTOLIC BLOOD PRESSURE: 82 MMHG | WEIGHT: 154 LBS | BODY MASS INDEX: 25.66 KG/M2 | RESPIRATION RATE: 16 BRPM

## 2022-04-07 PROCEDURE — 99214 OFFICE O/P EST MOD 30 MIN: CPT

## 2022-04-07 NOTE — HISTORY OF PRESENT ILLNESS
[FreeTextEntry1] : Rx refill and general follow-up [de-identified] : Patient is a 76 year old female with a past medical history as below who presents for Rx refill and general follow-up. Patient states she is taking all medications as prescribed and denies any adverse reactions or side effects. She denies any gastrointestinal issues on Metformin. She notes less frequent UTIs since starting Methenamine Hippurate and OTC Cranberry capsules. Patient has been attending PT for MS as recommended by neurologist, Dr. Lee. Patient requests Rx refill for Lyrica (Pregabalin) which she states is helping with chronic back and neck pain secondary to spinal stenosis.

## 2022-04-07 NOTE — ADDENDUM
[FreeTextEntry1] : I, Mark Euceda, acted solely as scribe for Dr. Emerson Smith DO on this date 04/07/2022  9:30AM .\par \par All medical record entries made by the Scribe were at my, Dr. Emerson Smith DO direction and personally dictated by me on 04/07/2022  9:30AM. I have reviewed the chart and agree that the record accurately reflects my personal performance of the history, physical exam, assessment and plan. I have also personally directed, reviewed and agreed with the chart.\par

## 2022-04-07 NOTE — PLAN
[FreeTextEntry1] : Musculoskeletal \par chronic back/neck pain - secondary to spinal stenosis - continue Lyrica (Pregabalin) 75mg TID p.o.q.d. as directed, Rx filled,  reviewed; Reference Number: [id number]; previously advised to take the third dose of the medication shortly before going to sleep; continue Meloxicam 15mg p.o. p.r.n. with food as directed \par gait instability - continue PT at [facility]; previously advised caution when ambulating \par Neurology\par history of MS - continue PT; continue exercises recommended by neurologist, Dr. Lee\par ENT/Immunology\par seasonal allergies - continue Montelukast Sodium (Singulair) 10mg p.o.q.d. as directed; continue Zyrtec p.o. as directed\par Cardiology\par hyperlipidemia - continue low cholesterol/low fat diet \par Endocrinology\par hyperglycemia (prediabetes) - continue Metformin HCl ER 500mg p.o.q.d. as directed - continue low carbohydrate/low sugar diet\par Vitamin B12 deficiency - continue OTC Vitamin B12 1000mcg p.o.q.d. as directed \par history of Vitamin D insufficiency - continue Vitamin D-3 1000 IU p.o.q.d. with a meal as directed \par Gynecology\par Previously advised to follow up with GYN\par Endocrinology/Musculoskeletal\par osteoporosis - continue Prolia 60mg/mL subcutaneous injections q. 6 months; continue Vitamin D-3 1000 IU p.o.q.d. with a meal as directed; continue weight-bearing exercises\par Integumentary\par psoriasis of scalp - continue Clobetasol Propionate 0.05% External Solution: apply to affected area BID q.d. as directed - continue to follow up with dermatology \par Psychiatry\par Continue Venlafaxine HCl 25mg TID p.o.q.d. as directed \par Urology\par chronic UTIs - continue Methenamine Hippurate 1 GM p.o.q.d. as directed and OTC Cranberry 405mg BID p.o.q.d.\par incontinence/overactive bladder - continue Trospium Chloride ER 60mg p.o.q.d. as directed

## 2022-04-07 NOTE — HEALTH RISK ASSESSMENT
[Never] : Never [No] : In the past 12 months have you used drugs other than those required for medical reasons? No [0] : 2) Feeling down, depressed, or hopeless: Not at all (0) [PHQ-2 Negative - No further assessment needed] : PHQ-2 Negative - No further assessment needed [Fully functional (bathing, dressing, toileting, transferring, walking, feeding)] : Fully functional (bathing, dressing, toileting, transferring, walking, feeding) [Fully functional (using the telephone, shopping, preparing meals, housekeeping, doing laundry, using] : Fully functional and needs no help or supervision to perform IADLs (using the telephone, shopping, preparing meals, housekeeping, doing laundry, using transportation, managing medications and managing finances) [Audit-CScore] : 0 [de-identified] : Exercise bike for 10 minutes every morning, walks around indoor pool.  [[id number]] : 0 [MammogramDate] : 03/22 [MammogramComments] : Mammogram: BI RADS 3: Probably being findings; no suspicious abnormalities seen sonographically in left breast; 6-month follow-up Mammogram recommended.  \par  [PapSmearComments] : S/p hysterectomy.  [BoneDensityDate] : 07/19 [BoneDensityComments] : Revealed osteoporosis.

## 2022-05-05 ENCOUNTER — LABORATORY RESULT (OUTPATIENT)
Age: 77
End: 2022-05-05

## 2022-05-05 ENCOUNTER — APPOINTMENT (OUTPATIENT)
Dept: INTERNAL MEDICINE | Facility: CLINIC | Age: 77
End: 2022-05-05
Payer: MEDICARE

## 2022-05-05 VITALS
SYSTOLIC BLOOD PRESSURE: 120 MMHG | HEART RATE: 89 BPM | RESPIRATION RATE: 16 BRPM | OXYGEN SATURATION: 99 % | TEMPERATURE: 98.7 F | BODY MASS INDEX: 25.49 KG/M2 | WEIGHT: 153 LBS | DIASTOLIC BLOOD PRESSURE: 70 MMHG | HEIGHT: 65 IN

## 2022-05-05 PROCEDURE — 99214 OFFICE O/P EST MOD 30 MIN: CPT | Mod: 25

## 2022-05-05 PROCEDURE — 36415 COLL VENOUS BLD VENIPUNCTURE: CPT

## 2022-05-05 NOTE — HEALTH RISK ASSESSMENT
[Never] : Never [No] : In the past 12 months have you used drugs other than those required for medical reasons? No [0] : 2) Feeling down, depressed, or hopeless: Not at all (0) [PHQ-2 Negative - No further assessment needed] : PHQ-2 Negative - No further assessment needed [Fully functional (bathing, dressing, toileting, transferring, walking, feeding)] : Fully functional (bathing, dressing, toileting, transferring, walking, feeding) [Fully functional (using the telephone, shopping, preparing meals, housekeeping, doing laundry, using] : Fully functional and needs no help or supervision to perform IADLs (using the telephone, shopping, preparing meals, housekeeping, doing laundry, using transportation, managing medications and managing finances) [Audit-CScore] : 0 [de-identified] : Exercise bike for 10 minutes every morning, walks around indoor pool.  [[id number]] : 0 [MammogramDate] : 03/22 [MammogramComments] : Mammogram: BI RADS 3: Probably being findings; no suspicious abnormalities seen sonographically in left breast; 6-month follow-up Mammogram recommended.  \par  [PapSmearComments] : S/p hysterectomy.  [BoneDensityDate] : 07/19 [BoneDensityComments] : Revealed osteoporosis.

## 2022-05-05 NOTE — PLAN
[FreeTextEntry1] : ENT/Immunology\par seasonal allergies - continue Montelukast Sodium (Singulair) 10mg p.o.q.d. as directed; continue Zyrtec p.o. as directed\par Cardiology\par hyperlipidemia - continue low cholesterol/low fat diet - check FLP \par Endocrinology\par hyperglycemia (prediabetes) - continue Metformin HCl ER 500mg p.o.q.d. as directed, Rx filled - continue low carbohydrate/low sugar diet - check hemoglobin A1C\par history of hypothyroidism - check thyroid panel \par Vitamin B12 deficiency - continue OTC Vitamin B12 1000mcg p.o.q.d. as directed - check Serum Vitamin B12\par history of Vitamin D insufficiency - continue Vitamin D-3 1000 IU p.o.q.d. with a meal as directed - check Vitamin D\par Gynecology\par Previously advised to follow up with GYN\par Endocrinology/Musculoskeletal\par osteoporosis - continue Prolia 60mg/mL subcutaneous injections q. 6 months; continue Vitamin D-3 1000 IU p.o.q.d. with a meal as directed; continue weight-bearing exercises\par Musculoskeletal \par chronic back/neck pain - secondary to spinal stenosis - continue Lyrica (Pregabalin) 75mg TID p.o.q.d. as directed; previously advised to take the third dose of the medication shortly before going to sleep\par arm pain - start Voltaren (Diclofenac) Gel as recommended by neurologist, Dr. Lee \par gait instability - continue PT at [facility]; previously advised caution when ambulating \par Neurology\par history of MS - continue PT; continue exercises recommended by neurologist, Dr. Lee\par Integumentary\par psoriasis of scalp - continue Clobetasol Propionate 0.05% External Solution: apply to affected area BID q.d. as directed - continue to follow up with dermatology \par Psychiatry\par Continue Venlafaxine HCl 25mg TID p.o.q.d. as directed \par Urology\par chronic UTIs - continue Methenamine Hippurate 1 GM p.o.q.d. as directed and OTC Cranberry 405mg BID p.o.q.d.\par incontinence/overactive bladder - continue Trospium Chloride ER 60mg p.o.q.d. as directed\par \par check female panel, hemoglobin A1C, thyroid panel, and Serum Vitamin B12

## 2022-05-05 NOTE — HISTORY OF PRESENT ILLNESS
[FreeTextEntry1] : fasting blood work, Rx refill, and general follow-up [de-identified] : Patient is a 76 year old female with a past medical history as below who presents for fasting blood work, Rx refill, and general follow-up. Patient states she is taking all medications as prescribed and denies any adverse reactions or side effects. She denies any gastrointestinal issues on Metformin. She notes less frequent UTIs since starting Methenamine Hippurate and OTC Cranberry capsules. Patient has been attending PT for MS as recommended by neurologist, Dr. Lee. Patient has noted arm pain. Dr. Lee recommended Voltaren Gel. Since her last visit, patient also notes seeing Dr. Perez for trigger point injections. Patient notes lumbar radiculopathy secondary to multiple back surgeries. Patient has been maintaining a well-balanced diet and exercising regularly (stationary bike for 30 minutes daily). Patient received the 2nd booster dose of the COVID-19 Vaccine on 4/23/22. Patient requests Rx refill for Metformin.

## 2022-05-05 NOTE — ASSESSMENT
[FreeTextEntry1] : Patient is a 76 year old female with a past medical history as above who presents for fasting blood work, Rx refill, and general follow-up.

## 2022-05-09 LAB
25(OH)D3 SERPL-MCNC: 31.8 NG/ML
ALBUMIN SERPL ELPH-MCNC: 4.6 G/DL
ALP BLD-CCNC: 59 U/L
ALT SERPL-CCNC: 9 U/L
ANION GAP SERPL CALC-SCNC: 13 MMOL/L
AST SERPL-CCNC: 15 U/L
BASOPHILS # BLD AUTO: 0.03 K/UL
BASOPHILS NFR BLD AUTO: 0.3 %
BILIRUB SERPL-MCNC: 0.3 MG/DL
BUN SERPL-MCNC: 19 MG/DL
CALCIUM SERPL-MCNC: 9.9 MG/DL
CHLORIDE SERPL-SCNC: 104 MMOL/L
CHOLEST SERPL-MCNC: 226 MG/DL
CO2 SERPL-SCNC: 24 MMOL/L
CREAT SERPL-MCNC: 0.67 MG/DL
EGFR: 89 ML/MIN/1.73M2
EOSINOPHIL # BLD AUTO: 0.08 K/UL
EOSINOPHIL NFR BLD AUTO: 0.9 %
ESTIMATED AVERAGE GLUCOSE: 126 MG/DL
GLUCOSE SERPL-MCNC: 112 MG/DL
HBA1C MFR BLD HPLC: 6 %
HCT VFR BLD CALC: 45.1 %
HDLC SERPL-MCNC: 89 MG/DL
HGB BLD-MCNC: 13.7 G/DL
IMM GRANULOCYTES NFR BLD AUTO: 0.4 %
LDLC SERPL CALC-MCNC: 121 MG/DL
LYMPHOCYTES # BLD AUTO: 0.84 K/UL
LYMPHOCYTES NFR BLD AUTO: 9 %
MAN DIFF?: NORMAL
MCHC RBC-ENTMCNC: 28.4 PG
MCHC RBC-ENTMCNC: 30.4 GM/DL
MCV RBC AUTO: 93.6 FL
MONOCYTES # BLD AUTO: 1.08 K/UL
MONOCYTES NFR BLD AUTO: 11.6 %
NEUTROPHILS # BLD AUTO: 7.27 K/UL
NEUTROPHILS NFR BLD AUTO: 77.8 %
NONHDLC SERPL-MCNC: 137 MG/DL
PLATELET # BLD AUTO: 221 K/UL
POTASSIUM SERPL-SCNC: 4.4 MMOL/L
PROT SERPL-MCNC: 7.1 G/DL
RBC # BLD: 4.82 M/UL
RBC # FLD: 14.1 %
SODIUM SERPL-SCNC: 142 MMOL/L
T3 SERPL-MCNC: 112 NG/DL
T3FREE SERPL-MCNC: 2.81 PG/ML
T3RU NFR SERPL: 1.2 TBI
T4 FREE SERPL-MCNC: 1 NG/DL
T4 SERPL-MCNC: 7.7 UG/DL
THYROGLOB AB SERPL-ACNC: <20 IU/ML
THYROPEROXIDASE AB SERPL IA-ACNC: <10 IU/ML
TRIGL SERPL-MCNC: 79 MG/DL
TSH SERPL-ACNC: 4.43 UIU/ML
VIT B12 SERPL-MCNC: 1325 PG/ML
WBC # FLD AUTO: 9.34 K/UL

## 2022-05-26 ENCOUNTER — RX RENEWAL (OUTPATIENT)
Age: 77
End: 2022-05-26

## 2022-06-19 RX ORDER — OXYCODONE AND ACETAMINOPHEN 10; 325 MG/1; MG/1
10-325 TABLET ORAL
Qty: 90 | Refills: 0 | Status: DISCONTINUED | COMMUNITY
Start: 2022-06-19 | End: 2022-06-19

## 2022-06-20 ENCOUNTER — APPOINTMENT (OUTPATIENT)
Dept: INTERNAL MEDICINE | Facility: CLINIC | Age: 77
End: 2022-06-20
Payer: MEDICARE

## 2022-06-20 VITALS
DIASTOLIC BLOOD PRESSURE: 70 MMHG | SYSTOLIC BLOOD PRESSURE: 108 MMHG | RESPIRATION RATE: 16 BRPM | OXYGEN SATURATION: 98 % | TEMPERATURE: 96.8 F | WEIGHT: 152 LBS | BODY MASS INDEX: 25.33 KG/M2 | HEIGHT: 65 IN | HEART RATE: 101 BPM

## 2022-06-20 DIAGNOSIS — Z23 ENCOUNTER FOR IMMUNIZATION: ICD-10-CM

## 2022-06-20 PROCEDURE — 96372 THER/PROPH/DIAG INJ SC/IM: CPT

## 2022-06-20 PROCEDURE — 99214 OFFICE O/P EST MOD 30 MIN: CPT | Mod: 25

## 2022-06-21 PROBLEM — Z23 ENCOUNTER FOR IMMUNIZATION: Status: ACTIVE | Noted: 2020-10-12

## 2022-06-21 NOTE — HISTORY OF PRESENT ILLNESS
[FreeTextEntry1] : Prolia injection [de-identified] : Patient is a 77 year old female with a past medical history as below who presents for her Prolia injection. Patient states she is taking all medications as prescribed and denies any adverse reactions or side effects. She denies any gastrointestinal issues on Metformin. She notes less frequent UTIs since starting Methenamine Hippurate and OTC Cranberry capsules. Patient continues to note neck pain secondary to spinal stenosis. Lyrica (Pregabalin) has been helping to manage the pain.

## 2022-06-21 NOTE — PLAN
[FreeTextEntry1] : Endocrinology/Musculoskeletal\par osteoporosis - Prolia 60mg/mL administered subcutaneously to left triceps area; next injection to be administered in approximately 6 months; continue Vitamin D-3 1000 IU p.o.q.d. with a meal as directed; continue weight-bearing exercises\par ENT/Immunology\par seasonal allergies - continue Montelukast Sodium (Singulair) 10mg p.o.q.d. as directed; continue Zyrtec p.o. as directed\par Cardiology\par hyperlipidemia - continue low cholesterol/low fat diet \par Endocrinology\par hyperglycemia (prediabetes) - continue Metformin HCl ER 500mg p.o.q.d. as directed - continue low carbohydrate/low sugar diet\par Vitamin B12 deficiency - continue OTC Vitamin B12 1000mcg p.o.q.d. as directed\par history of Vitamin D insufficiency - continue Vitamin D-3 1000 IU p.o.q.d. with a meal as directed \par Gynecology\par Previously advised to follow up with GYN\par Musculoskeletal\par chronic back/neck pain - secondary to spinal stenosis - continue Lyrica (Pregabalin) 75mg TID p.o.q.d. as directed; previously advised to take the third dose of the medication shortly before going to sleep\par arm pain - continue Voltaren (Diclofenac) Gel as recommended by neurologist, Dr. Lee \par gait instability - continue PT at [facility]; previously advised caution when ambulating \par Neurology\par history of MS - continue PT; continue exercises recommended by neurologist, Dr. Lee\par Integumentary\par psoriasis of scalp - continue Clobetasol Propionate 0.05% External Solution: apply to affected area BID q.d. as directed - continue to follow up with dermatology \par Psychiatry\par Continue Venlafaxine HCl 25mg TID p.o.q.d. as directed \par Urology\par chronic UTIs - continue Methenamine Hippurate 1 GM p.o.q.d. as directed and OTC Cranberry 405mg BID p.o.q.d.\par incontinence/overactive bladder - continue Trospium Chloride ER 60mg p.o.q.d. as directed\par \par Repeat fasting blood work to be done in mid August.

## 2022-06-21 NOTE — HEALTH RISK ASSESSMENT
[Never] : Never [No] : In the past 12 months have you used drugs other than those required for medical reasons? No [0] : 2) Feeling down, depressed, or hopeless: Not at all (0) [PHQ-2 Negative - No further assessment needed] : PHQ-2 Negative - No further assessment needed [Fully functional (bathing, dressing, toileting, transferring, walking, feeding)] : Fully functional (bathing, dressing, toileting, transferring, walking, feeding) [Fully functional (using the telephone, shopping, preparing meals, housekeeping, doing laundry, using] : Fully functional and needs no help or supervision to perform IADLs (using the telephone, shopping, preparing meals, housekeeping, doing laundry, using transportation, managing medications and managing finances) [Audit-CScore] : 0 [de-identified] : Exercise bike for 10 minutes every morning, walks around indoor pool.  [[id number]] : 0 [MammogramDate] : 03/22 [MammogramComments] : Mammogram: BI RADS 3: Probably being findings; no suspicious abnormalities seen sonographically in left breast; 6-month follow-up Mammogram recommended.  \par  [PapSmearComments] : S/p hysterectomy.  [BoneDensityDate] : 07/19 [BoneDensityComments] : Revealed osteoporosis.

## 2022-06-21 NOTE — PHYSICAL EXAM
[No Acute Distress] : no acute distress [Well Nourished] : well nourished [Well Developed] : well developed [Well-Appearing] : well-appearing [Normal Sclera/Conjunctiva] : normal sclera/conjunctiva [PERRL] : pupils equal round and reactive to light [EOMI] : extraocular movements intact [Normal Outer Ear/Nose] : the outer ears and nose were normal in appearance [Normal Oropharynx] : the oropharynx was normal [No JVD] : no jugular venous distention [No Lymphadenopathy] : no lymphadenopathy [Supple] : supple [Thyroid Normal, No Nodules] : the thyroid was normal and there were no nodules present [No Respiratory Distress] : no respiratory distress  [No Accessory Muscle Use] : no accessory muscle use [Clear to Auscultation] : lungs were clear to auscultation bilaterally [Normal Rate] : normal rate  [Regular Rhythm] : with a regular rhythm [Normal S1, S2] : normal S1 and S2 [No Murmur] : no murmur heard [No Carotid Bruits] : no carotid bruits [No Abdominal Bruit] : a ~M bruit was not heard ~T in the abdomen [No Varicosities] : no varicosities [Pedal Pulses Present] : the pedal pulses are present [No Edema] : there was no peripheral edema [No Palpable Aorta] : no palpable aorta [No Extremity Clubbing/Cyanosis] : no extremity clubbing/cyanosis [Soft] : abdomen soft [Non Tender] : non-tender [Non-distended] : non-distended [No Masses] : no abdominal mass palpated [No HSM] : no HSM [Normal Bowel Sounds] : normal bowel sounds [Normal Posterior Cervical Nodes] : no posterior cervical lymphadenopathy [Normal Anterior Cervical Nodes] : no anterior cervical lymphadenopathy [No CVA Tenderness] : no CVA  tenderness [No Spinal Tenderness] : no spinal tenderness [No Joint Swelling] : no joint swelling [Grossly Normal Strength/Tone] : grossly normal strength/tone [No Rash] : no rash [Coordination Grossly Intact] : coordination grossly intact [No Focal Deficits] : no focal deficits [Normal Gait] : normal gait [Deep Tendon Reflexes (DTR)] : deep tendon reflexes were 2+ and symmetric [Normal Affect] : the affect was normal [Normal Insight/Judgement] : insight and judgment were intact [Normal Voice/Communication] : normal voice/communication [Normal TMs] : both tympanic membranes were normal [Normal Supraclavicular Nodes] : no supraclavicular lymphadenopathy [Kyphosis] : kyphosis [Scoliosis] : scoliosis [Acne] : no acne [Speech Grossly Normal] : speech grossly normal [Memory Grossly Normal] : memory grossly normal [Alert and Oriented x3] : oriented to person, place, and time [Normal Mood] : the mood was normal

## 2022-06-21 NOTE — ASSESSMENT
[FreeTextEntry1] : Patient is a 77 year old female with a past medical history as above who presents for her Prolia injection.

## 2022-06-21 NOTE — ADDENDUM
[FreeTextEntry1] : I, Mark Euceda, acted solely as scribe for Dr. Emerson Smith DO on this date 06/20/2022  3:20PM .\par \par All medical record entries made by the Scribe were at my, Dr. Emerson Smith DO direction and personally dictated by me on 06/20/2022  3:20PM. I have reviewed the chart and agree that the record accurately reflects my personal performance of the history, physical exam, assessment and plan. I have also personally directed, reviewed and agreed with the chart.

## 2022-08-29 ENCOUNTER — APPOINTMENT (OUTPATIENT)
Dept: INTERNAL MEDICINE | Facility: CLINIC | Age: 77
End: 2022-08-29

## 2022-08-29 VITALS
WEIGHT: 150 LBS | BODY MASS INDEX: 24.99 KG/M2 | SYSTOLIC BLOOD PRESSURE: 112 MMHG | RESPIRATION RATE: 16 BRPM | HEIGHT: 65 IN | OXYGEN SATURATION: 97 % | TEMPERATURE: 97.2 F | DIASTOLIC BLOOD PRESSURE: 70 MMHG | HEART RATE: 103 BPM

## 2022-08-29 PROCEDURE — 99214 OFFICE O/P EST MOD 30 MIN: CPT

## 2022-08-29 NOTE — HEALTH RISK ASSESSMENT
[Never] : Never [No] : In the past 12 months have you used drugs other than those required for medical reasons? No [0] : 2) Feeling down, depressed, or hopeless: Not at all (0) [PHQ-2 Negative - No further assessment needed] : PHQ-2 Negative - No further assessment needed [Fully functional (bathing, dressing, toileting, transferring, walking, feeding)] : Fully functional (bathing, dressing, toileting, transferring, walking, feeding) [Fully functional (using the telephone, shopping, preparing meals, housekeeping, doing laundry, using] : Fully functional and needs no help or supervision to perform IADLs (using the telephone, shopping, preparing meals, housekeeping, doing laundry, using transportation, managing medications and managing finances) [Audit-CScore] : 0 [de-identified] : Exercise bike for 10 minutes every morning, walks around indoor pool.  [[id number]] : 0 [MammogramDate] : 03/22 [MammogramComments] : Mammogram: BI RADS 3: Probably being findings; no suspicious abnormalities seen sonographically in left breast; 6-month follow-up Mammogram recommended.  \par  [PapSmearComments] : S/p hysterectomy.  [BoneDensityDate] : 07/19 [BoneDensityComments] : Revealed osteoporosis.

## 2022-08-29 NOTE — PHYSICAL EXAM
[No Acute Distress] : no acute distress [Well Nourished] : well nourished [Well Developed] : well developed [Well-Appearing] : well-appearing [Normal Sclera/Conjunctiva] : normal sclera/conjunctiva [PERRL] : pupils equal round and reactive to light [EOMI] : extraocular movements intact [Normal Outer Ear/Nose] : the outer ears and nose were normal in appearance [Normal Oropharynx] : the oropharynx was normal [No JVD] : no jugular venous distention [No Lymphadenopathy] : no lymphadenopathy [Supple] : supple [Thyroid Normal, No Nodules] : the thyroid was normal and there were no nodules present [No Respiratory Distress] : no respiratory distress  [No Accessory Muscle Use] : no accessory muscle use [Clear to Auscultation] : lungs were clear to auscultation bilaterally [Normal Rate] : normal rate  [Regular Rhythm] : with a regular rhythm [Normal S1, S2] : normal S1 and S2 [No Murmur] : no murmur heard [No Carotid Bruits] : no carotid bruits [No Abdominal Bruit] : a ~M bruit was not heard ~T in the abdomen [No Varicosities] : no varicosities [Pedal Pulses Present] : the pedal pulses are present [No Edema] : there was no peripheral edema [No Palpable Aorta] : no palpable aorta [No Extremity Clubbing/Cyanosis] : no extremity clubbing/cyanosis [Soft] : abdomen soft [Non Tender] : non-tender [Non-distended] : non-distended [No Masses] : no abdominal mass palpated [No HSM] : no HSM [Normal Bowel Sounds] : normal bowel sounds [Normal Posterior Cervical Nodes] : no posterior cervical lymphadenopathy [Normal Anterior Cervical Nodes] : no anterior cervical lymphadenopathy [No CVA Tenderness] : no CVA  tenderness [No Spinal Tenderness] : no spinal tenderness [No Joint Swelling] : no joint swelling [Grossly Normal Strength/Tone] : grossly normal strength/tone [No Rash] : no rash [Coordination Grossly Intact] : coordination grossly intact [No Focal Deficits] : no focal deficits [Deep Tendon Reflexes (DTR)] : deep tendon reflexes were 2+ and symmetric [Normal Affect] : the affect was normal [Normal Insight/Judgement] : insight and judgment were intact [Normal Voice/Communication] : normal voice/communication [Normal TMs] : both tympanic membranes were normal [Normal Nasal Mucosa] : the nasal mucosa was normal [Normal Supraclavicular Nodes] : no supraclavicular lymphadenopathy [Kyphosis] : kyphosis [Scoliosis] : scoliosis [Speech Grossly Normal] : speech grossly normal [Memory Grossly Normal] : memory grossly normal [Normal Mood] : the mood was normal [de-identified] : uses cane

## 2022-08-29 NOTE — ASSESSMENT
[FreeTextEntry1] : Patient is a 77 year old female with a past medical history as above who presents for Rx for fasting blood work and general follow-up.
Pt on Klonopin 1 mg TID prn at home, but only takes medication once a day at bedtime if needed  - Continue at 1 mg at bedtime prn for now, can increase frequency if pt has uncontrolled anxiety  - Added melatonin 3 mg prn at bedtime for insomnia

## 2022-08-29 NOTE — PLAN
[FreeTextEntry1] : ENT/Immunology\par seasonal allergies - continue Montelukast Sodium (Singulair) 10mg p.o.q.d. as directed; continue Zyrtec p.o. as directed\par Cardiology\par hyperlipidemia - continue low cholesterol/low fat diet - check FLP\par Endocrinology\par hyperglycemia (prediabetes) - continue Metformin HCl ER 500mg p.o.q.d. as directed - continue low carbohydrate/low sugar diet - check hemoglobin A1C\par history of Vitamin D insufficiency - continue Vitamin D-3 1000 IU p.o.q.d. with a meal as directed - check Vitamin D\par Vitamin B12 deficiency - continue OTC Vitamin B12 1000mcg p.o.q.d. as directed - check Serum Vitamin B12\par Endocrinology/Musculoskeletal\par osteoporosis - continue Prolia 60mg/mL subcutaneous injections q. 6 months; continue Vitamin D-3 1000 IU p.o.q.d. with a meal as directed; continue weight-bearing exercise\par Gynecology\par Previously advised to follow up with GYN\par Musculoskeletal\par chronic back/neck pain - secondary to spinal stenosis - continue Lyrica (Pregabalin) 75mg TID p.o.q.d. as directed; previously advised to take the third dose of the medication shortly before going to sleep\par arm pain - continue Voltaren (Diclofenac) Gel as recommended by neurologist, Dr. Lee \par gait instability - continue PT at [facility]; previously advised caution when ambulating \par Neurology\par history of MS - continue PT; continue exercises recommended by neurologist, Dr. Lee\par Integumentary\par psoriasis of scalp - continue Clobetasol Propionate 0.05% External Solution: apply to affected area BID q.d. as directed - continue to follow up with dermatology \par Psychiatry\par Continue Venlafaxine HCl 25mg TID p.o.q.d. as directed \par Urology\par chronic UTIs - continue Methenamine Hippurate 1 GM p.o.q.d. as directed and OTC Cranberry 405mg BID p.o.q.d.\par incontinence/overactive bladder - continue Trospium Chloride ER 60mg p.o.q.d. as directed\par \par Rx given for fasting blood work (female panel, hemoglobin A1C, thyroid panel, and Serum Vitamin B12); recommended following up at a [facility].

## 2022-08-29 NOTE — ADDENDUM
[FreeTextEntry1] : I, Mark Euceda, acted solely as scribe for Dr. Emerson Smith DO on this date 08/29/2022  3:20PM .\par \par All medical record entries made by the Scribe were at my, Dr. Emerson Smith DO direction and personally dictated by me on 08/29/2022  3:20PM. I have reviewed the chart and agree that the record accurately reflects my personal performance of the history, physical exam, assessment and plan. I have also personally directed, reviewed and agreed with the chart.

## 2022-08-29 NOTE — HISTORY OF PRESENT ILLNESS
[FreeTextEntry1] : Rx for fasting blood work and general follow-up [de-identified] : Patient is a 77 year old female with a past medical history as below who presents for Rx for fasting blood work and general follow-up. Patient states she is taking all medications as prescribed and denies any adverse reactions or side effects. She denies gastrointestinal issues on Metformin. She notes less frequent UTIs since starting Methenamine Hippurate and OTC Cranberry capsules. Patient has been attending PT for MS as recommended by neurologist, Dr. Lee. Patient continues to note neck pain secondary to spinal stenosis. Lyrica (Pregabalin) has been helping to manage the pain.

## 2022-08-31 ENCOUNTER — LABORATORY RESULT (OUTPATIENT)
Age: 77
End: 2022-08-31

## 2022-09-01 LAB
25(OH)D3 SERPL-MCNC: 41.2 NG/ML
ALBUMIN SERPL ELPH-MCNC: 4.3 G/DL
ALP BLD-CCNC: 52 U/L
ALT SERPL-CCNC: 8 U/L
ANION GAP SERPL CALC-SCNC: 9 MMOL/L
AST SERPL-CCNC: 13 U/L
BASOPHILS # BLD AUTO: 0.03 K/UL
BASOPHILS NFR BLD AUTO: 0.4 %
BILIRUB SERPL-MCNC: 0.3 MG/DL
BUN SERPL-MCNC: 15 MG/DL
CALCIUM SERPL-MCNC: 10.1 MG/DL
CHLORIDE SERPL-SCNC: 103 MMOL/L
CHOLEST SERPL-MCNC: 200 MG/DL
CO2 SERPL-SCNC: 28 MMOL/L
CREAT SERPL-MCNC: 0.76 MG/DL
EGFR: 80 ML/MIN/1.73M2
EOSINOPHIL # BLD AUTO: 0.25 K/UL
EOSINOPHIL NFR BLD AUTO: 3.7 %
ESTIMATED AVERAGE GLUCOSE: 123 MG/DL
GLUCOSE SERPL-MCNC: 108 MG/DL
HBA1C MFR BLD HPLC: 5.9 %
HCT VFR BLD CALC: 44.7 %
HDLC SERPL-MCNC: 72 MG/DL
HGB BLD-MCNC: 13.9 G/DL
IMM GRANULOCYTES NFR BLD AUTO: 0.3 %
LDLC SERPL CALC-MCNC: 106 MG/DL
LYMPHOCYTES # BLD AUTO: 1.44 K/UL
LYMPHOCYTES NFR BLD AUTO: 21.4 %
MAN DIFF?: NORMAL
MCHC RBC-ENTMCNC: 28.1 PG
MCHC RBC-ENTMCNC: 31.1 GM/DL
MCV RBC AUTO: 90.5 FL
MONOCYTES # BLD AUTO: 0.77 K/UL
MONOCYTES NFR BLD AUTO: 11.4 %
NEUTROPHILS # BLD AUTO: 4.22 K/UL
NEUTROPHILS NFR BLD AUTO: 62.8 %
NONHDLC SERPL-MCNC: 128 MG/DL
PLATELET # BLD AUTO: 262 K/UL
POTASSIUM SERPL-SCNC: 5.1 MMOL/L
PROT SERPL-MCNC: 7.1 G/DL
RBC # BLD: 4.94 M/UL
RBC # FLD: 14.1 %
SODIUM SERPL-SCNC: 141 MMOL/L
T3 SERPL-MCNC: 104 NG/DL
T3FREE SERPL-MCNC: 2.6 PG/ML
T3RU NFR SERPL: 1.1 TBI
T4 FREE SERPL-MCNC: 1.2 NG/DL
T4 SERPL-MCNC: 8.3 UG/DL
THYROGLOB AB SERPL-ACNC: <20 IU/ML
THYROPEROXIDASE AB SERPL IA-ACNC: 11 IU/ML
TRIGL SERPL-MCNC: 112 MG/DL
TSH SERPL-ACNC: 3.05 UIU/ML
VIT B12 SERPL-MCNC: 1437 PG/ML
WBC # FLD AUTO: 6.73 K/UL

## 2022-09-16 ENCOUNTER — APPOINTMENT (OUTPATIENT)
Dept: INTERNAL MEDICINE | Facility: CLINIC | Age: 77
End: 2022-09-16

## 2022-09-16 VITALS
SYSTOLIC BLOOD PRESSURE: 122 MMHG | TEMPERATURE: 98.6 F | OXYGEN SATURATION: 100 % | RESPIRATION RATE: 14 BRPM | DIASTOLIC BLOOD PRESSURE: 80 MMHG | WEIGHT: 151 LBS | HEIGHT: 65 IN | HEART RATE: 100 BPM | BODY MASS INDEX: 25.16 KG/M2

## 2022-09-16 PROCEDURE — 99214 OFFICE O/P EST MOD 30 MIN: CPT

## 2022-09-18 NOTE — HEALTH RISK ASSESSMENT
[Never] : Never [No] : In the past 12 months have you used drugs other than those required for medical reasons? No [0] : 2) Feeling down, depressed, or hopeless: Not at all (0) [PHQ-2 Negative - No further assessment needed] : PHQ-2 Negative - No further assessment needed [Fully functional (bathing, dressing, toileting, transferring, walking, feeding)] : Fully functional (bathing, dressing, toileting, transferring, walking, feeding) [Fully functional (using the telephone, shopping, preparing meals, housekeeping, doing laundry, using] : Fully functional and needs no help or supervision to perform IADLs (using the telephone, shopping, preparing meals, housekeeping, doing laundry, using transportation, managing medications and managing finances) [Audit-CScore] : 0 [de-identified] : Exercise bike for 10 minutes every morning, walks around indoor pool.  [[id number]] : 0 [MammogramDate] : 03/22 [MammogramComments] : Mammogram: BI RADS 3: Probably being findings; no suspicious abnormalities seen sonographically in left breast; 6-month follow-up Mammogram recommended.  \par  [PapSmearComments] : S/p hysterectomy.  [BoneDensityDate] : 07/19 [BoneDensityComments] : Revealed osteoporosis.

## 2022-09-18 NOTE — ASSESSMENT
[FreeTextEntry1] : Patient is a 77 year old female with a past medical history as above who presents for Rx refill and general follow-up.

## 2022-09-18 NOTE — ADDENDUM
[FreeTextEntry1] : I, Mark Euceda, acted solely as scribe for Dr. Emerson Smith DO on this date 09/16/2022 12:40PM .\par \par All medical record entries made by the Scribe were at my, Dr. Emerson Smith DO direction and personally dictated by me on 09/16/2022 12:40PM. I have reviewed the chart and agree that the record accurately reflects my personal performance of the history, physical exam, assessment and plan. I have also personally directed, reviewed and agreed with the chart.

## 2022-09-18 NOTE — PHYSICAL EXAM
[No Acute Distress] : no acute distress [Well Nourished] : well nourished [Well Developed] : well developed [Well-Appearing] : well-appearing [Normal Sclera/Conjunctiva] : normal sclera/conjunctiva [PERRL] : pupils equal round and reactive to light [EOMI] : extraocular movements intact [Normal Outer Ear/Nose] : the outer ears and nose were normal in appearance [Normal Oropharynx] : the oropharynx was normal [No JVD] : no jugular venous distention [No Lymphadenopathy] : no lymphadenopathy [Supple] : supple [Thyroid Normal, No Nodules] : the thyroid was normal and there were no nodules present [No Respiratory Distress] : no respiratory distress  [No Accessory Muscle Use] : no accessory muscle use [Clear to Auscultation] : lungs were clear to auscultation bilaterally [Normal Rate] : normal rate  [Regular Rhythm] : with a regular rhythm [Normal S1, S2] : normal S1 and S2 [No Murmur] : no murmur heard [No Carotid Bruits] : no carotid bruits [No Abdominal Bruit] : a ~M bruit was not heard ~T in the abdomen [No Varicosities] : no varicosities [Pedal Pulses Present] : the pedal pulses are present [No Edema] : there was no peripheral edema [No Palpable Aorta] : no palpable aorta [No Extremity Clubbing/Cyanosis] : no extremity clubbing/cyanosis [Soft] : abdomen soft [Non Tender] : non-tender [Non-distended] : non-distended [No Masses] : no abdominal mass palpated [No HSM] : no HSM [Normal Bowel Sounds] : normal bowel sounds [Normal Posterior Cervical Nodes] : no posterior cervical lymphadenopathy [Normal Anterior Cervical Nodes] : no anterior cervical lymphadenopathy [No CVA Tenderness] : no CVA  tenderness [No Spinal Tenderness] : no spinal tenderness [No Joint Swelling] : no joint swelling [Grossly Normal Strength/Tone] : grossly normal strength/tone [No Rash] : no rash [Coordination Grossly Intact] : coordination grossly intact [No Focal Deficits] : no focal deficits [Deep Tendon Reflexes (DTR)] : deep tendon reflexes were 2+ and symmetric [Normal Affect] : the affect was normal [Normal Insight/Judgement] : insight and judgment were intact [Normal Voice/Communication] : normal voice/communication [Normal TMs] : both tympanic membranes were normal [Normal Nasal Mucosa] : the nasal mucosa was normal [Normal Supraclavicular Nodes] : no supraclavicular lymphadenopathy [Kyphosis] : kyphosis [Scoliosis] : scoliosis [Speech Grossly Normal] : speech grossly normal [Memory Grossly Normal] : memory grossly normal [Normal Mood] : the mood was normal [de-identified] : uses cane

## 2022-09-18 NOTE — PLAN
[FreeTextEntry1] : Musculoskeletal\par chronic back/neck pain - secondary to spinal stenosis - continue Lyrica (Pregabalin) 75mg TID p.o.q.d. as directed, Rx filled,  reviewed; Reference Number: [id number]; previously advised to take the third dose of the medication shortly before going to sleep\par arm pain - continue Voltaren (Diclofenac) Gel as recommended by neurologist, Dr. Lee \par gait instability - continue PT at [facility]; previously advised caution when ambulating \par Neurology\par history of MS - continue PT; continue exercises recommended by neurologist, Dr. Lee\par ENT/Immunology\par seasonal allergies - continue Montelukast Sodium (Singulair) 10mg p.o.q.d. as directed; continue Zyrtec p.o. as directed\par Cardiology\par hyperlipidemia - continue low cholesterol/low fat diet\par Endocrinology\par hyperglycemia (prediabetes) - continue Metformin HCl ER 500mg p.o.q.d. as directed - continue low carbohydrate/low sugar diet \par history of Vitamin D insufficiency - continue Vitamin D-3 1000 IU p.o.q.d. with a meal as directed\par Vitamin B12 deficiency - continue OTC Vitamin B12 1000mcg p.o.q.d. as directed \par Endocrinology/Musculoskeletal\par osteoporosis - continue Prolia 60mg/mL subcutaneous injections q. 6 months; continue Vitamin D-3 1000 IU p.o.q.d. with a meal as directed; continue weight-bearing exercise\par Gynecology\par Previously advised to follow up with GYN\par Integumentary\par psoriasis of scalp - continue Clobetasol Propionate 0.05% External Solution: apply to affected area BID q.d. as directed - continue to follow up with dermatology \par Psychiatry\par Continue Venlafaxine HCl 25mg TID p.o.q.d. as directed \par Urology\par chronic UTIs - continue Methenamine Hippurate 1 GM p.o.q.d. as directed and OTC Cranberry 405mg BID p.o.q.d.\par incontinence/overactive bladder - continue Trospium Chloride ER 60mg p.o.q.d. as directed\par Immunization\par Recommended following up in 1 month for flu vaccine administration \par Recommended following up at pharmacy in approximately 1 month for updated COVID-19 vaccine booster\par \par RTO in 1 month.

## 2022-09-18 NOTE — HISTORY OF PRESENT ILLNESS
[FreeTextEntry1] : Rx refill and general follow-up [de-identified] : Patient is a 77 year old female with a past medical history as below who presents for Rx refill and general follow-up. Patient states she is taking all medications as prescribed and denies any adverse reactions or side effects. She denies gastrointestinal issues on Metformin. She notes less frequent UTIs since starting Methenamine Hippurate and OTC Cranberry capsules. \par \par Patient has been attending PT for MS as recommended by neurologist, Dr. Lee. \par \par Patient is interested in receiving the flu vaccine for this season. She also inquires about the updated COVID-19 vaccine booster. \par \par Patient requests Rx refill for Lyrica (Pregabalin) which she states is helping with chronic back and neck pain secondary to spinal stenosis.

## 2022-10-12 ENCOUNTER — APPOINTMENT (OUTPATIENT)
Dept: INTERNAL MEDICINE | Facility: CLINIC | Age: 77
End: 2022-10-12

## 2022-10-22 NOTE — ASSESSMENT
Need for prophylactic measure [FreeTextEntry1] : Patient is a 74 year old female with a past medical history as above who presents for Prevnar 13.  Hypertension

## 2022-11-10 ENCOUNTER — APPOINTMENT (OUTPATIENT)
Dept: DERMATOLOGY | Facility: CLINIC | Age: 77
End: 2022-11-10

## 2022-11-10 DIAGNOSIS — L82.0 INFLAMED SEBORRHEIC KERATOSIS: ICD-10-CM

## 2022-11-10 DIAGNOSIS — L71.1 RHINOPHYMA: ICD-10-CM

## 2022-11-10 PROCEDURE — 17110 DESTRUCTION B9 LES UP TO 14: CPT

## 2022-11-10 PROCEDURE — 99213 OFFICE O/P EST LOW 20 MIN: CPT | Mod: 25

## 2022-11-19 ENCOUNTER — RX RENEWAL (OUTPATIENT)
Age: 77
End: 2022-11-19

## 2022-11-19 RX ORDER — DENOSUMAB 60 MG/ML
60 INJECTION SUBCUTANEOUS
Qty: 1 | Refills: 2 | Status: ACTIVE | COMMUNITY
Start: 2019-08-23 | End: 1900-01-01

## 2022-11-29 ENCOUNTER — APPOINTMENT (OUTPATIENT)
Dept: INTERNAL MEDICINE | Facility: CLINIC | Age: 77
End: 2022-11-29

## 2022-11-29 VITALS
WEIGHT: 151 LBS | HEART RATE: 100 BPM | TEMPERATURE: 98.5 F | OXYGEN SATURATION: 96 % | DIASTOLIC BLOOD PRESSURE: 82 MMHG | RESPIRATION RATE: 16 BRPM | BODY MASS INDEX: 25.16 KG/M2 | SYSTOLIC BLOOD PRESSURE: 118 MMHG | HEIGHT: 65 IN

## 2022-11-29 PROCEDURE — 99214 OFFICE O/P EST MOD 30 MIN: CPT | Mod: 25

## 2022-11-29 PROCEDURE — 36415 COLL VENOUS BLD VENIPUNCTURE: CPT

## 2022-11-29 RX ORDER — AMOXICILLIN 500 MG/1
500 CAPSULE ORAL
Qty: 42 | Refills: 0 | Status: DISCONTINUED | COMMUNITY
Start: 2022-08-24

## 2022-11-29 NOTE — PHYSICAL EXAM
[No Acute Distress] : no acute distress [Well Nourished] : well nourished [Well Developed] : well developed [Well-Appearing] : well-appearing [Normal Sclera/Conjunctiva] : normal sclera/conjunctiva [PERRL] : pupils equal round and reactive to light [EOMI] : extraocular movements intact [Normal Outer Ear/Nose] : the outer ears and nose were normal in appearance [Normal Oropharynx] : the oropharynx was normal [No JVD] : no jugular venous distention [No Lymphadenopathy] : no lymphadenopathy [Supple] : supple [Thyroid Normal, No Nodules] : the thyroid was normal and there were no nodules present [No Respiratory Distress] : no respiratory distress  [No Accessory Muscle Use] : no accessory muscle use [Clear to Auscultation] : lungs were clear to auscultation bilaterally [Normal Rate] : normal rate  [Regular Rhythm] : with a regular rhythm [Normal S1, S2] : normal S1 and S2 [No Murmur] : no murmur heard [No Carotid Bruits] : no carotid bruits [No Abdominal Bruit] : a ~M bruit was not heard ~T in the abdomen [No Varicosities] : no varicosities [Pedal Pulses Present] : the pedal pulses are present [No Edema] : there was no peripheral edema [No Palpable Aorta] : no palpable aorta [No Extremity Clubbing/Cyanosis] : no extremity clubbing/cyanosis [Soft] : abdomen soft [Non Tender] : non-tender [Non-distended] : non-distended [No Masses] : no abdominal mass palpated [No HSM] : no HSM [Normal Bowel Sounds] : normal bowel sounds [Normal Posterior Cervical Nodes] : no posterior cervical lymphadenopathy [Normal Anterior Cervical Nodes] : no anterior cervical lymphadenopathy [No CVA Tenderness] : no CVA  tenderness [No Spinal Tenderness] : no spinal tenderness [No Joint Swelling] : no joint swelling [Grossly Normal Strength/Tone] : grossly normal strength/tone [No Rash] : no rash [Coordination Grossly Intact] : coordination grossly intact [No Focal Deficits] : no focal deficits [Deep Tendon Reflexes (DTR)] : deep tendon reflexes were 2+ and symmetric [Normal Affect] : the affect was normal [Normal Insight/Judgement] : insight and judgment were intact [Normal Voice/Communication] : normal voice/communication [Normal TMs] : both tympanic membranes were normal [Normal Nasal Mucosa] : the nasal mucosa was normal [Normal Supraclavicular Nodes] : no supraclavicular lymphadenopathy [Kyphosis] : kyphosis [Scoliosis] : scoliosis [Speech Grossly Normal] : speech grossly normal [Memory Grossly Normal] : memory grossly normal [Normal Mood] : the mood was normal [de-identified] : uses cane

## 2022-11-29 NOTE — HISTORY OF PRESENT ILLNESS
[FreeTextEntry1] : blood work, Rx refill, and general follow-up [de-identified] : Patient is a 77 year old female with a past medical history as below who presents for blood work, Rx refill, and general follow-up. \par \par Patient states she is taking all medications as prescribed and denies any adverse reactions or side effects. She denies gastrointestinal issues on Metformin. She notes less frequent UTIs since starting Methenamine Hippurate and OTC Cranberry capsules. \par \par Patient has been attending PT for MS as recommended by neurologist, Dr. Lee. \par \par Since last visit, patient notes seeing urologist Dr. Epps; Renal US is reportedly done annually.\par \par Patient recently saw dermatologist, Dr. Du regarding inflamed seborrheic keratosis, s/p cryotherapy.\par \par Patient has been trying to maintain a well-balanced, low carbohydrate/low sugar diet.\par \par Patient requests Rx refill for Pregabalin (Lyrica) which she states is helping with chronic back and neck pain secondary to spinal stenosis.

## 2022-11-29 NOTE — HEALTH RISK ASSESSMENT
[Never] : Never [No] : In the past 12 months have you used drugs other than those required for medical reasons? No [0] : 2) Feeling down, depressed, or hopeless: Not at all (0) [PHQ-2 Negative - No further assessment needed] : PHQ-2 Negative - No further assessment needed [Fully functional (bathing, dressing, toileting, transferring, walking, feeding)] : Fully functional (bathing, dressing, toileting, transferring, walking, feeding) [Fully functional (using the telephone, shopping, preparing meals, housekeeping, doing laundry, using] : Fully functional and needs no help or supervision to perform IADLs (using the telephone, shopping, preparing meals, housekeeping, doing laundry, using transportation, managing medications and managing finances) [Audit-CScore] : 0 [de-identified] : Exercise bike for 10 minutes every morning, walks around indoor pool.  [SNA4Heiwz] : 0 [MammogramDate] : 03/22 [MammogramComments] : Mammogram: BI RADS 3: Probably being findings; no suspicious abnormalities seen sonographically in left breast; 6-month follow-up Mammogram recommended.  \par  [PapSmearComments] : S/p hysterectomy.  [BoneDensityDate] : 07/19 [BoneDensityComments] : Revealed osteoporosis.

## 2022-11-29 NOTE — PLAN
[FreeTextEntry1] : Musculoskeletal\par chronic back/neck pain - secondary to spinal stenosis - continue Pregabalin (Lyrica) 75mg TID p.o.q.d. as directed, Rx filled,  reviewed; Reference Number: [id number]; previously advised patient to take the third dose of the medication shortly before going to sleep\par arm pain - continue Voltaren (Diclofenac) Gel as recommended by neurologist, Dr. Lee \par gait instability - continue PT at [facility]; previously advised caution when ambulating \par Neurology\par history of MS - continue PT; continue exercises recommended by neurologist, Dr. Lee\par ENT/Immunology\par seasonal allergies - continue Montelukast Sodium (Singulair) 10mg p.o.q.d. as directed; continue Zyrtec p.o. as directed\par Cardiology\par hyperlipidemia - continue low cholesterol/low fat diet\par Endocrinology\par hyperglycemia (prediabetes) - continue Metformin HCl ER 500mg p.o.q.d. as directed - continue low carbohydrate/low sugar diet - check hemoglobin A1C\par history of Vitamin D insufficiency - continue Vitamin D-3 1000 IU p.o.q.d. with a meal as directed\par Vitamin B12 deficiency - continue OTC Vitamin B12 1000mcg p.o.q.d. as directed \par Endocrinology/Musculoskeletal\par osteoporosis - continue Prolia 60mg/mL subcutaneous injection q. 6 months; continue Vitamin D-3 1000 IU p.o.q.d. with a meal as directed; continue weight-bearing exercise\par Gynecology\par Previously advised to follow up with GYN\par Integumentary\par psoriasis of scalp - continue Clobetasol Propionate 0.05% External Solution: apply to affected area BID q.d. as directed - continue to follow up with dermatology \par Psychiatry\par Continue Venlafaxine HCl 25mg TID p.o.q.d. as directed \par Urology\par chronic UTIs - continue Methenamine Hippurate 1 GM p.o.q.d. as directed and OTC Cranberry 405mg BID p.o.q.d.\par incontinence/overactive bladder - continue Trospium Chloride ER 60mg p.o.q.d. as directed\par Continue to follow up with urologist, Dr. Epps\par \par check hemoglobin A1C

## 2022-11-29 NOTE — ASSESSMENT
[FreeTextEntry1] : Patient is a 77 year old female with a past medical history as above who presents for blood work, Rx refill, and general follow-up.

## 2022-11-30 LAB
ESTIMATED AVERAGE GLUCOSE: 126 MG/DL
HBA1C MFR BLD HPLC: 6 %

## 2022-12-18 ENCOUNTER — RX RENEWAL (OUTPATIENT)
Age: 77
End: 2022-12-18

## 2022-12-23 ENCOUNTER — APPOINTMENT (OUTPATIENT)
Dept: INTERNAL MEDICINE | Facility: CLINIC | Age: 77
End: 2022-12-23
Payer: MEDICARE

## 2022-12-23 VITALS
HEART RATE: 78 BPM | HEIGHT: 65 IN | DIASTOLIC BLOOD PRESSURE: 80 MMHG | WEIGHT: 151 LBS | OXYGEN SATURATION: 96 % | BODY MASS INDEX: 25.16 KG/M2 | TEMPERATURE: 98.9 F | RESPIRATION RATE: 17 BRPM | SYSTOLIC BLOOD PRESSURE: 122 MMHG

## 2022-12-23 PROCEDURE — 99214 OFFICE O/P EST MOD 30 MIN: CPT | Mod: 25

## 2022-12-24 NOTE — ADDENDUM
[FreeTextEntry1] : I, Mark Euceda, acted solely as scribe for Dr. Emerson Smith, DO this date 12/23/2022  2:00PM .\par \par All medical record entries made by the Scribe were at my, Dr. Emerson Smith, DO direction and personally dictated by me on 12/23/2022  2:00PM. I have reviewed the chart and agree that the record accurately reflects my personal performance of the history, physical exam, assessment and plan. I have also personally directed, reviewed and agreed with the chart.

## 2022-12-24 NOTE — PHYSICAL EXAM
[No Acute Distress] : no acute distress [Well Nourished] : well nourished [Well Developed] : well developed [Well-Appearing] : well-appearing [Normal Sclera/Conjunctiva] : normal sclera/conjunctiva [PERRL] : pupils equal round and reactive to light [EOMI] : extraocular movements intact [Normal Outer Ear/Nose] : the outer ears and nose were normal in appearance [Normal Oropharynx] : the oropharynx was normal [No JVD] : no jugular venous distention [No Lymphadenopathy] : no lymphadenopathy [Supple] : supple [Thyroid Normal, No Nodules] : the thyroid was normal and there were no nodules present [No Respiratory Distress] : no respiratory distress  [No Accessory Muscle Use] : no accessory muscle use [Clear to Auscultation] : lungs were clear to auscultation bilaterally [Normal Rate] : normal rate  [Regular Rhythm] : with a regular rhythm [Normal S1, S2] : normal S1 and S2 [No Murmur] : no murmur heard [No Carotid Bruits] : no carotid bruits [No Abdominal Bruit] : a ~M bruit was not heard ~T in the abdomen [No Varicosities] : no varicosities [Pedal Pulses Present] : the pedal pulses are present [No Edema] : there was no peripheral edema [No Palpable Aorta] : no palpable aorta [No Extremity Clubbing/Cyanosis] : no extremity clubbing/cyanosis [Soft] : abdomen soft [Non Tender] : non-tender [Non-distended] : non-distended [No Masses] : no abdominal mass palpated [No HSM] : no HSM [Normal Bowel Sounds] : normal bowel sounds [Normal Posterior Cervical Nodes] : no posterior cervical lymphadenopathy [Normal Anterior Cervical Nodes] : no anterior cervical lymphadenopathy [No CVA Tenderness] : no CVA  tenderness [No Spinal Tenderness] : no spinal tenderness [No Joint Swelling] : no joint swelling [Grossly Normal Strength/Tone] : grossly normal strength/tone [No Rash] : no rash [Coordination Grossly Intact] : coordination grossly intact [No Focal Deficits] : no focal deficits [Deep Tendon Reflexes (DTR)] : deep tendon reflexes were 2+ and symmetric [Normal Affect] : the affect was normal [Normal Insight/Judgement] : insight and judgment were intact [Normal Voice/Communication] : normal voice/communication [Normal TMs] : both tympanic membranes were normal [Normal Nasal Mucosa] : the nasal mucosa was normal [Normal Supraclavicular Nodes] : no supraclavicular lymphadenopathy [Kyphosis] : kyphosis [Scoliosis] : scoliosis [Speech Grossly Normal] : speech grossly normal [Memory Grossly Normal] : memory grossly normal [Normal Mood] : the mood was normal [de-identified] : uses cane

## 2022-12-24 NOTE — ASSESSMENT
[FreeTextEntry1] : Patient is a 77 year old female with a past medical history as above who presents for Prolia injection.

## 2022-12-24 NOTE — HEALTH RISK ASSESSMENT
[Never] : Never [No] : In the past 12 months have you used drugs other than those required for medical reasons? No [0] : 2) Feeling down, depressed, or hopeless: Not at all (0) [PHQ-2 Negative - No further assessment needed] : PHQ-2 Negative - No further assessment needed [Fully functional (bathing, dressing, toileting, transferring, walking, feeding)] : Fully functional (bathing, dressing, toileting, transferring, walking, feeding) [Fully functional (using the telephone, shopping, preparing meals, housekeeping, doing laundry, using] : Fully functional and needs no help or supervision to perform IADLs (using the telephone, shopping, preparing meals, housekeeping, doing laundry, using transportation, managing medications and managing finances) [Audit-CScore] : 0 [de-identified] : Exercise bike for 10 minutes every morning, walks around indoor pool.  [[id number]] : 0 [MammogramDate] : 03/22 [MammogramComments] : Mammogram: BI RADS 3: Probably being findings; no suspicious abnormalities seen sonographically in left breast; 6-month follow-up Mammogram recommended.   [PapSmearComments] : S/p hysterectomy.  [BoneDensityDate] : 07/19 [BoneDensityComments] : Revealed osteoporosis.

## 2022-12-24 NOTE — HISTORY OF PRESENT ILLNESS
[FreeTextEntry1] : Prolia injection [de-identified] : Patient is a 77 year old female with a past medical history as below who presents for Prolia injection.\par \par Patient states she is taking all medications as prescribed and denies any adverse reactions or side effects. She denies gastrointestinal issues on Metformin. She notes less frequent UTIs since starting Methenamine Hippurate and OTC Cranberry capsules. \par \par Patient has been attending PT for MS as recommended by neurologist, Dr. Lee. \par \par Patient has been trying to maintain a well-balanced, low carbohydrate/low sugar diet.

## 2022-12-24 NOTE — PLAN
[FreeTextEntry1] : Endocrinology/Musculoskeletal\par osteoporosis - Prolia 60mg/mL administered subcutaneously; continue Vitamin D-3 1000 IU p.o.q.d. with a meal as directed; continue weight-bearing exercise\par Musculoskeletal\par chronic back/neck pain - secondary to spinal stenosis - continue Pregabalin (Lyrica) 75mg TID p.o.q.d. as directed; previously advised patient to take the third dose of the medication shortly before going to sleep\par arm pain - continue Voltaren (Diclofenac) Gel as recommended by neurologist, Dr. Lee \par gait instability - continue PT at [facility]; previously advised caution when ambulating \par Neurology\par history of MS - continue PT; continue exercises recommended by neurologist, Dr. Lee\par ENT/Immunology\par seasonal allergies - continue Montelukast Sodium (Singulair) 10mg p.o.q.d. as directed; continue Zyrtec p.o. as directed\par Cardiology\par hyperlipidemia - continue low cholesterol/low fat diet\par Endocrinology\par hyperglycemia (prediabetes) - continue Metformin HCl ER 500mg p.o.q.d. as directed - continue low carbohydrate/low sugar diet \par history of Vitamin D insufficiency - continue Vitamin D-3 1000 IU p.o.q.d. with a meal as directed\par Vitamin B12 deficiency - continue OTC Vitamin B12 1000mcg p.o.q.d. as directed \par Gynecology\par Previously advised to follow up with GYN\par Integumentary\par psoriasis of scalp - continue Clobetasol Propionate 0.05% External Solution: apply to affected area BID q.d. as directed - continue to follow up with dermatology \par Psychiatry\par Continue Venlafaxine HCl 25mg TID p.o.q.d. as directed \par Urology\par chronic UTIs - continue Methenamine Hippurate 1 GM p.o.q.d. as directed and OTC Cranberry 405mg BID p.o.q.d.\par incontinence/overactive bladder - continue Trospium Chloride ER 60mg p.o.q.d. as directed\par Continue to follow up with urologist, Dr. Epps

## 2023-01-23 ENCOUNTER — LABORATORY RESULT (OUTPATIENT)
Age: 78
End: 2023-01-23

## 2023-02-01 ENCOUNTER — OUTPATIENT (OUTPATIENT)
Dept: OUTPATIENT SERVICES | Facility: HOSPITAL | Age: 78
LOS: 1 days | End: 2023-02-01
Payer: MEDICARE

## 2023-02-01 ENCOUNTER — APPOINTMENT (OUTPATIENT)
Dept: ULTRASOUND IMAGING | Facility: CLINIC | Age: 78
End: 2023-02-01
Payer: MEDICARE

## 2023-02-01 DIAGNOSIS — Z00.8 ENCOUNTER FOR OTHER GENERAL EXAMINATION: ICD-10-CM

## 2023-02-01 PROCEDURE — 76770 US EXAM ABDO BACK WALL COMP: CPT

## 2023-02-01 PROCEDURE — 76770 US EXAM ABDO BACK WALL COMP: CPT | Mod: 26

## 2023-02-27 ENCOUNTER — RESULT REVIEW (OUTPATIENT)
Age: 78
End: 2023-02-27

## 2023-02-27 ENCOUNTER — APPOINTMENT (OUTPATIENT)
Dept: INTERNAL MEDICINE | Facility: CLINIC | Age: 78
End: 2023-02-27
Payer: MEDICARE

## 2023-02-27 VITALS
HEIGHT: 65 IN | SYSTOLIC BLOOD PRESSURE: 124 MMHG | TEMPERATURE: 96.3 F | HEART RATE: 93 BPM | WEIGHT: 150 LBS | BODY MASS INDEX: 24.99 KG/M2 | OXYGEN SATURATION: 96 % | DIASTOLIC BLOOD PRESSURE: 78 MMHG | RESPIRATION RATE: 16 BRPM

## 2023-02-27 DIAGNOSIS — R92.8 OTHER ABNORMAL AND INCONCLUSIVE FINDINGS ON DIAGNOSTIC IMAGING OF BREAST: ICD-10-CM

## 2023-02-27 DIAGNOSIS — Z12.39 ENCOUNTER FOR OTHER SCREENING FOR MALIGNANT NEOPLASM OF BREAST: ICD-10-CM

## 2023-02-27 DIAGNOSIS — M19.90 UNSPECIFIED OSTEOARTHRITIS, UNSPECIFIED SITE: ICD-10-CM

## 2023-02-27 DIAGNOSIS — Z12.11 ENCOUNTER FOR SCREENING FOR MALIGNANT NEOPLASM OF COLON: ICD-10-CM

## 2023-02-27 PROCEDURE — 99214 OFFICE O/P EST MOD 30 MIN: CPT | Mod: 25

## 2023-02-28 PROBLEM — Z12.39 BREAST CANCER SCREENING: Status: ACTIVE | Noted: 2021-08-06

## 2023-02-28 PROBLEM — Z12.11 COLON CANCER SCREENING: Status: ACTIVE | Noted: 2023-02-27

## 2023-02-28 PROBLEM — R92.8 ABNORMAL MAMMOGRAM: Status: ACTIVE | Noted: 2022-03-16

## 2023-02-28 PROBLEM — M19.90 OSTEOARTHRITIS: Status: RESOLVED | Noted: 2019-02-21 | Resolved: 2023-02-28

## 2023-02-28 NOTE — ASSESSMENT
[FreeTextEntry1] : Patient is a 77 year old female with a past medical history as above who presents for follow-up.

## 2023-02-28 NOTE — HISTORY OF PRESENT ILLNESS
[FreeTextEntry1] : Follow-up [de-identified] : Patient is a 77 year old female with a past medical history as below who presents for follow-up\par \par Patient states she is taking all medications as prescribed and denies any adverse reactions or side effects. She denies gastrointestinal issues on Metformin. She notes less frequent UTIs since starting Methenamine Hippurate and OTC Cranberry capsules. \par \par Patient has been attending PT for MS as recommended by neurologist, Dr. Lee. \par \par Patient has been trying to maintain a well-balanced, low carbohydrate/low sugar diet.\par \par Patient had mammogram done in September, results found no evidence of malignancy, recommended additional six month follow-up for routine bilateral mammogram and breast US in March 2023.\par \par Patient reports she was told she would not need a colonoscopy anymore by gastroenterologist but is open to completing a stool kit for colon cancer screening.\par \par Patient received flu vaccine this season and most recent Covid-19 booster. She is unsure if she received shingles vaccine.\par \par Request refill for Metformin.

## 2023-02-28 NOTE — END OF VISIT
[FreeTextEntry3] : This note was written by Nadia Quintana on 02/27/2023, acting as a scribe for Dr. Emerson Smith DO.\par \par All medic record entries were at my, Dr. Emerson Smith DO , direction and personally dictated by me in 02/27/2023. I have personally reviewed the chart and agree that the record accurately reflects my personal performance of the history, physical exam, assessment, and plan.\par

## 2023-02-28 NOTE — PLAN
[FreeTextEntry1] : Endocrinology/Musculoskeletal\par osteoporosis - continue Vitamin D-3 1000 IU p.o.q.d. with a meal as directed; continue weight-bearing exercise\par Musculoskeletal\par chronic back/neck pain - secondary to spinal stenosis - continue Pregabalin (Lyrica) 75mg TID p.o.q.d. as directed; previously advised patient to take the third dose of the medication shortly before going to sleep\par arm pain - continue Voltaren (Diclofenac) Gel as recommended by neurologist, Dr. Lee \par gait instability - continue PT at [facility]; previously advised caution when ambulating \par Neurology\par history of MS - continue PT; continue exercises recommended by neurologist, Dr. Lee\par ENT/Immunology\par seasonal allergies - continue Montelukast Sodium (Singulair) 10mg p.o.q.d. as directed; continue Zyrtec p.o. as directed\par Cardiology\par hyperlipidemia - continue low cholesterol/low fat diet\par Endocrinology\par hyperglycemia (prediabetes) - continue Metformin HCl ER 500mg p.o.q.d. as directed; Rx filled - continue low carbohydrate/low sugar diet \par history of Vitamin D insufficiency - continue Vitamin D-3 1000 IU p.o.q.d. with a meal as directed\par Vitamin B12 deficiency - continue OTC Vitamin B12 1000mcg p.o.q.d. as directed \par Integumentary\par psoriasis of scalp - continue Clobetasol Propionate 0.05% External Solution: apply to affected area BID q.d. as directed - continue to follow up with dermatology \par Psychiatry\par Continue Venlafaxine HCl 25mg TID p.o.q.d. as directed \par Urology\par chronic UTIs - continue Methenamine Hippurate 1 GM p.o.q.d. as directed and OTC Cranberry 405mg BID p.o.q.d.\par incontinence/overactive bladder - continue Trospium Chloride ER 60mg p.o.q.d. as directed\par Continue to follow up with urologist, Dr. Epps\par Gynecology\par Rx for mammogram and bilateral breast US\par Gastroenterology\par Rx for colon cancer screening kit\par Immunization\par shingles- pt will call [facility] to see if she received vaccine\par

## 2023-02-28 NOTE — PHYSICAL EXAM
[No Acute Distress] : no acute distress [Well Nourished] : well nourished [Well Developed] : well developed [Well-Appearing] : well-appearing [Normal Sclera/Conjunctiva] : normal sclera/conjunctiva [PERRL] : pupils equal round and reactive to light [EOMI] : extraocular movements intact [Normal Outer Ear/Nose] : the outer ears and nose were normal in appearance [Normal Oropharynx] : the oropharynx was normal [No JVD] : no jugular venous distention [No Lymphadenopathy] : no lymphadenopathy [Supple] : supple [Thyroid Normal, No Nodules] : the thyroid was normal and there were no nodules present [No Respiratory Distress] : no respiratory distress  [No Accessory Muscle Use] : no accessory muscle use [Clear to Auscultation] : lungs were clear to auscultation bilaterally [Normal Rate] : normal rate  [Regular Rhythm] : with a regular rhythm [Normal S1, S2] : normal S1 and S2 [No Murmur] : no murmur heard [No Carotid Bruits] : no carotid bruits [No Abdominal Bruit] : a ~M bruit was not heard ~T in the abdomen [No Varicosities] : no varicosities [Pedal Pulses Present] : the pedal pulses are present [No Edema] : there was no peripheral edema [No Palpable Aorta] : no palpable aorta [No Extremity Clubbing/Cyanosis] : no extremity clubbing/cyanosis [Soft] : abdomen soft [Non Tender] : non-tender [Non-distended] : non-distended [No Masses] : no abdominal mass palpated [No HSM] : no HSM [Normal Bowel Sounds] : normal bowel sounds [Normal Posterior Cervical Nodes] : no posterior cervical lymphadenopathy [Normal Anterior Cervical Nodes] : no anterior cervical lymphadenopathy [No CVA Tenderness] : no CVA  tenderness [No Spinal Tenderness] : no spinal tenderness [No Joint Swelling] : no joint swelling [Grossly Normal Strength/Tone] : grossly normal strength/tone [No Rash] : no rash [Coordination Grossly Intact] : coordination grossly intact [No Focal Deficits] : no focal deficits [Normal Gait] : normal gait [Deep Tendon Reflexes (DTR)] : deep tendon reflexes were 2+ and symmetric [Normal Affect] : the affect was normal [Normal Insight/Judgement] : insight and judgment were intact [Normal Voice/Communication] : normal voice/communication [Normal TMs] : both tympanic membranes were normal [Normal Nasal Mucosa] : the nasal mucosa was normal [Normal Supraclavicular Nodes] : no supraclavicular lymphadenopathy [Kyphosis] : kyphosis [Scoliosis] : scoliosis [Acne] : no acne [Speech Grossly Normal] : speech grossly normal [Memory Grossly Normal] : memory grossly normal [Alert and Oriented x3] : oriented to person, place, and time [Normal Mood] : the mood was normal

## 2023-03-12 LAB — HEMOCCULT STL QL IA: NEGATIVE

## 2023-03-17 ENCOUNTER — APPOINTMENT (OUTPATIENT)
Dept: ULTRASOUND IMAGING | Facility: CLINIC | Age: 78
End: 2023-03-17
Payer: MEDICARE

## 2023-03-17 ENCOUNTER — RESULT REVIEW (OUTPATIENT)
Age: 78
End: 2023-03-17

## 2023-03-17 ENCOUNTER — APPOINTMENT (OUTPATIENT)
Dept: MAMMOGRAPHY | Facility: CLINIC | Age: 78
End: 2023-03-17
Payer: MEDICARE

## 2023-03-17 ENCOUNTER — OUTPATIENT (OUTPATIENT)
Dept: OUTPATIENT SERVICES | Facility: HOSPITAL | Age: 78
LOS: 1 days | End: 2023-03-17
Payer: MEDICARE

## 2023-03-17 DIAGNOSIS — Z12.39 ENCOUNTER FOR OTHER SCREENING FOR MALIGNANT NEOPLASM OF BREAST: ICD-10-CM

## 2023-03-17 PROCEDURE — 77066 DX MAMMO INCL CAD BI: CPT

## 2023-03-17 PROCEDURE — G0279: CPT | Mod: 26

## 2023-03-17 PROCEDURE — 76641 ULTRASOUND BREAST COMPLETE: CPT | Mod: 26,50,GA

## 2023-03-17 PROCEDURE — G0279: CPT

## 2023-03-17 PROCEDURE — 77066 DX MAMMO INCL CAD BI: CPT | Mod: 26

## 2023-03-17 PROCEDURE — 76641 ULTRASOUND BREAST COMPLETE: CPT

## 2023-03-18 ENCOUNTER — RX RENEWAL (OUTPATIENT)
Age: 78
End: 2023-03-18

## 2023-05-07 RX ORDER — OXYCODONE AND ACETAMINOPHEN 10; 325 MG/1; MG/1
10-325 TABLET ORAL
Qty: 120 | Refills: 0 | Status: DISCONTINUED | COMMUNITY
Start: 2022-10-20 | End: 2023-05-07

## 2023-05-08 ENCOUNTER — APPOINTMENT (OUTPATIENT)
Dept: INTERNAL MEDICINE | Facility: CLINIC | Age: 78
End: 2023-05-08
Payer: MEDICARE

## 2023-05-08 VITALS
TEMPERATURE: 98.3 F | WEIGHT: 151 LBS | DIASTOLIC BLOOD PRESSURE: 80 MMHG | HEIGHT: 65 IN | SYSTOLIC BLOOD PRESSURE: 118 MMHG | OXYGEN SATURATION: 95 % | HEART RATE: 91 BPM | BODY MASS INDEX: 25.16 KG/M2 | RESPIRATION RATE: 16 BRPM

## 2023-05-08 DIAGNOSIS — Z86.39 PERSONAL HISTORY OF OTHER ENDOCRINE, NUTRITIONAL AND METABOLIC DISEASE: ICD-10-CM

## 2023-05-08 PROCEDURE — 99214 OFFICE O/P EST MOD 30 MIN: CPT | Mod: 25

## 2023-05-08 PROCEDURE — 36415 COLL VENOUS BLD VENIPUNCTURE: CPT

## 2023-05-15 PROBLEM — Z86.39 HISTORY OF HYPERTHYROIDISM: Status: RESOLVED | Noted: 2019-02-21 | Resolved: 2023-05-15

## 2023-05-15 NOTE — PLAN
[FreeTextEntry1] : Endocrinology/Musculoskeletal\par osteoporosis - continue Vitamin D-3 1000 IU p.o.q.d. with a meal as directed; continue weight-bearing exercise\par Musculoskeletal\par chronic back/neck pain - secondary to spinal stenosis - continue Pregabalin (Lyrica) 75mg TID p.o.q.d. as directed, Rx filled; previously advised patient to take the third dose of the medication shortly before going to sleep\par arm pain - continue Voltaren (Diclofenac) Gel as recommended by neurologist, Dr. Lee \par gait instability - continue PT at [facility]; previously advised caution when ambulating \par Neurology\par history of MS - continue PT; continue exercises recommended by neurologist, Dr. Lee\par ENT/Immunology\par seasonal allergies - continue Montelukast Sodium (Singulair) 10mg p.o.q.d. as directed; continue Zyrtec p.o. as directed\par Cardiology\par hyperlipidemia - continue low cholesterol/low fat diet - check FLP\par Endocrinology\par hyperglycemia (prediabetes) - continue Metformin HCl ER 500mg p.o.q.d. as directed- continue low carbohydrate/low sugar diet - check A1c\par history of Vitamin D insufficiency - continue Vitamin D-3 1000 IU p.o.q.d. with a meal as directed\par Vitamin B12 deficiency - continue OTC Vitamin B12 1000mcg p.o.q.d. as directed \par Integumentary\par psoriasis of scalp - continue Clobetasol Propionate 0.05% External Solution: apply to affected area BID q.d. as directed - continue to follow up with dermatology \par Psychiatry\par Continue Venlafaxine HCl 25mg TID p.o.q.d. as directed \par Urology\par chronic UTIs - continue Methenamine Hippurate 1 GM p.o.q.d. as directed and OTC Cranberry 405mg BID p.o.q.d.\par incontinence/overactive bladder - continue Trospium Chloride ER 60mg p.o.q.d. as directed\par Continue to follow up with urologist, Dr. Epps\par

## 2023-05-15 NOTE — HISTORY OF PRESENT ILLNESS
Assessment/Plan:    1  Tick bite of abdominal wall, initial encounter  -     Lyme Total Antibody Profile with reflex to WB; Future    2  BMI 26 0-26 9,adult          Patient Instructions     Weight Management   AMBULATORY CARE:   Why it is important to manage your weight:  Being overweight increases your risk of health conditions such as heart disease, high blood pressure, type 2 diabetes, and certain types of cancer  It can also increase your risk for osteoarthritis, sleep apnea, and other respiratory problems  Aim for a slow, steady weight loss  Even a small amount of weight loss can lower your risk of health problems  Risks of being overweight:  Extra weight can cause many health problems, including the following:  Diabetes (high blood sugar level)    High blood pressure or high cholesterol    Heart disease    Stroke    Gallbladder or liver disease    Cancer of the colon, breast, prostate, liver, or kidney    Sleep apnea    Arthritis or gout    Screening  is done to check for health conditions before you have signs or symptoms  If you are 28to 79years old, your blood sugar level may be checked every 3 years for signs of prediabetes or diabetes  Your healthcare provider will check your blood pressure at each visit  High blood pressure can lead to a stroke or other problems  Your provider may check for signs of heart disease, cancer, or other health problems  How to lose weight safely:  A safe and healthy way to lose weight is to eat fewer calories and get regular exercise  You can lose up about 1 pound a week by decreasing the number of calories you eat by 500 calories each day  You can decrease calories by eating smaller portion sizes or by cutting out high-calorie foods  Read labels to find out how many calories are in the foods you eat  You can also burn calories with exercise such as walking, swimming, or biking   You will be more likely to keep weight off if you make these changes part of your lifestyle  Exercise at least 30 minutes per day on most days of the week  You can also fit in more physical activity by taking the stairs instead of the elevator or parking farther away from stores  Ask your healthcare provider about the best exercise plan for you  Healthy meal plan for weight management:  A healthy meal plan includes a variety of foods, contains fewer calories, and helps you stay healthy  A healthy meal plan includes the following:     Eat whole-grain foods more often  A healthy meal plan should contain fiber  Fiber is the part of grains, fruits, and vegetables that is not broken down by your body  Whole-grain foods are healthy and provide extra fiber in your diet  Some examples of whole-grain foods are whole-wheat breads and pastas, oatmeal, brown rice, and bulgur  Eat a variety of vegetables every day  Include dark, leafy greens such as spinach, kale, gume greens, and mustard greens  Eat yellow and orange vegetables such as carrots, sweet potatoes, and winter squash  Eat a variety of fruits every day  Choose fresh or canned fruit (canned in its own juice or light syrup) instead of juice  Fruit juice has very little or no fiber  Eat low-fat dairy foods  Drink fat-free (skim) milk or 1% milk  Eat fat-free yogurt and low-fat cottage cheese  Try low-fat cheeses such as mozzarella and other reduced-fat cheeses  Choose meat and other protein foods that are low in fat  Choose beans or other legumes such as split peas or lentils  Choose fish, skinless poultry (chicken or turkey), or lean cuts of red meat (beef or pork)  Before you cook meat or poultry, cut off any visible fat  Use less fat and oil  Try baking foods instead of frying them  Add less fat, such as margarine, sour cream, regular salad dressing and mayonnaise to foods  Eat fewer high-fat foods  Some examples of high-fat foods include french fries, doughnuts, ice cream, and cakes  Eat fewer sweets    Limit foods and drinks that are high in sugar  This includes candy, cookies, regular soda, and sweetened drinks  Ways to decrease calories:   Eat smaller portions  Use a small plate with smaller servings  Do not eat second helpings  When you eat at a restaurant, ask for a box and place half of your meal in the box before you eat  Share an entrée with someone else  Replace high-calorie snacks with healthy, low-calorie snacks  Choose fresh fruit, vegetables, fat-free rice cakes, or air-popped popcorn instead of potato chips, nuts, or chocolate  Choose water or calorie-free drinks instead of soda or sweetened drinks  Do not shop for groceries when you are hungry  You may be more likely to make unhealthy food choices  Take a grocery list of healthy foods and shop after you have eaten  Eat regular meals  Do not skip meals  Skipping meals can lead to overeating later in the day  This can make it harder for you to lose weight  Eat a healthy snack in place of a meal if you do not have time to eat a regular meal  Talk with a dietitian to help you create a meal plan and schedule that is right for you  Other things to consider as you try to lose weight:   Be aware of situations that may give you the urge to overeat, such as eating while watching television  Find ways to avoid these situations  For example, read a book, go for a walk, or do crafts  Meet with a weight loss support group or friends who are also trying to lose weight  This may help you stay motivated to continue working on your weight loss goals  © Copyright [employer] [facility] 2022 Information is for End User's use only and may not be sold, redistributed or otherwise used for commercial purposes  All illustrations and images included in CareNotes® are the copyrighted property of [employer]  or Lachelle Cooper   The above information is an  only   It is not intended as medical advice for individual conditions or [FreeTextEntry1] : Follow-up treatments  Talk to your doctor, nurse or pharmacist before following any medical regimen to see if it is safe and effective for you  No follow-ups on file  Subjective:      Patient ID: Kristine Castellon is a 66 y o  male  Chief Complaint   Patient presents with    Tick bite     wmcma       Pt found the tick on himself Monday - three days ago  Unsure when he was bit  He has a red daniela not sure its a rash      The following portions of the patient's history were reviewed and updated as appropriate: allergies, current medications, past family history, past medical history, past social history, past surgical history and problem list     Review of Systems   Constitutional: Negative for activity change, appetite change, chills, diaphoresis, fatigue, fever and unexpected weight change  HENT: Negative for congestion, dental problem, ear pain, mouth sores, sinus pressure, sinus pain, sore throat and trouble swallowing  Eyes: Negative for photophobia, discharge and itching  Respiratory: Negative for apnea, chest tightness and shortness of breath  Cardiovascular: Negative for chest pain, palpitations and leg swelling  Gastrointestinal: Negative for abdominal distention, abdominal pain, blood in stool, nausea and vomiting  Endocrine: Negative for cold intolerance, heat intolerance, polydipsia, polyphagia and polyuria  Genitourinary: Negative for difficulty urinating  Musculoskeletal: Negative for arthralgias  Skin: Positive for wound  Negative for color change  Neurological: Negative for dizziness, syncope, speech difficulty and headaches  Hematological: Negative for adenopathy  Psychiatric/Behavioral: Negative for agitation and behavioral problems           Current Outpatient Medications   Medication Sig Dispense Refill    bacitracin topical ointment 500 units/g topical ointment Apply 1 large application topically 3 (three) times a day as needed for wound care      Calcium Carbonate-Vit D-Min (CALCIUM 1200 PO) Take 1 tablet by mouth daily      Multiple Vitamin (MULTIVITAMIN) tablet Take 1 tablet by mouth daily      naproxen (NAPROSYN) 500 mg tablet Take 1 tablet (500 mg total) by mouth 2 (two) times a day with meals for 10 days 20 tablet 0    rosuvastatin (CRESTOR) 20 MG tablet Take 1 tablet (20 mg total) by mouth daily 90 tablet 0    cyclobenzaprine (FLEXERIL) 10 mg tablet Take 1 tablet (10 mg total) by mouth daily at bedtime for 21 days (Patient not taking: Reported on 5/5/2022 ) 21 tablet 0    predniSONE 20 mg tablet 4 tabs for three days, 3 tabs for three days, 2 tabs for three days, 1 tab for three days, 1/2 tab for 4 days (Patient not taking: Reported on 5/5/2022 ) 32 tablet 0     No current facility-administered medications for this visit  Objective:    /80   Pulse 82   Temp 97 5 °F (36 4 °C)   Resp 16   Ht 5' 9 5" (1 765 m)   Wt 83 5 kg (184 lb)   SpO2 98%   BMI 26 78 kg/m²        Physical Exam  Skin:     Comments: Red area on abdomen, no draianage, no ecm rash at this time                Latrice Barber, DO  BMI Counseling: Body mass index is 26 78 kg/m²  The BMI is above normal  Nutrition recommendations include reducing portion sizes  [de-identified] : Patient is a 77 year old female with a past medical history as below who presents for follow-up\par \par Patient states she is taking all medications as prescribed and denies any adverse reactions or side effects. She denies gastrointestinal issues on Metformin. She notes less frequent UTIs since starting Methenamine Hippurate and OTC Cranberry capsules. \par \par Patient has been attending PT for MS as recommended by neurologist, Dr. Lee. Reports PT has helped her  her legs more and have more stable balance. She is now also able to raise her arms with less difficulty. \par \par Patient has been trying to maintain a well-balanced, low carbohydrate/low sugar diet.\par \par Mammogram done on 3/17/23 revealed no mammographic or sonographic evidence of malignancy, stable small 3 mm benign-appearing left breast mass. \par \par Patient reports she was told she would not need a colonoscopy anymore by gastroenterologist.\par \par Patient received flu vaccine this season and most recent Covid-19 booster. She is unsure if she received shingles vaccine.

## 2023-05-15 NOTE — END OF VISIT
[FreeTextEntry3] : This note was written by Nadia Quintana on 05/08/2023, acting as a scribe for Dr. Emerson Smith DO.\par \par All medic record entries were at my, Dr. Emerson Smith DO , direction and personally dictated by me in 05/08/2023. I have personally reviewed the chart and agree that the record accurately reflects my personal performance of the history, physical exam, assessment, and plan.\par

## 2023-05-18 LAB
25(OH)D3 SERPL-MCNC: 35.9 NG/ML
ALBUMIN SERPL ELPH-MCNC: 4.4 G/DL
ALP BLD-CCNC: 49 U/L
ALT SERPL-CCNC: 13 U/L
ANION GAP SERPL CALC-SCNC: 9 MMOL/L
AST SERPL-CCNC: 16 U/L
BASOPHILS # BLD AUTO: 0.02 K/UL
BASOPHILS NFR BLD AUTO: 0.3 %
BILIRUB SERPL-MCNC: 0.3 MG/DL
BUN SERPL-MCNC: 25 MG/DL
CALCIUM SERPL-MCNC: 10.2 MG/DL
CHLORIDE SERPL-SCNC: 104 MMOL/L
CHOLEST SERPL-MCNC: 225 MG/DL
CO2 SERPL-SCNC: 28 MMOL/L
CREAT SERPL-MCNC: 0.78 MG/DL
EGFR: 77 ML/MIN/1.73M2
EOSINOPHIL # BLD AUTO: 0.21 K/UL
EOSINOPHIL NFR BLD AUTO: 3.3 %
ESTIMATED AVERAGE GLUCOSE: 126 MG/DL
GLUCOSE SERPL-MCNC: 101 MG/DL
HBA1C MFR BLD HPLC: 6 %
HCT VFR BLD CALC: 42.5 %
HDLC SERPL-MCNC: 76 MG/DL
HGB BLD-MCNC: 13.2 G/DL
IMM GRANULOCYTES NFR BLD AUTO: 0.2 %
LDLC SERPL CALC-MCNC: 126 MG/DL
LYMPHOCYTES # BLD AUTO: 1.52 K/UL
LYMPHOCYTES NFR BLD AUTO: 24.1 %
MAN DIFF?: NORMAL
MCHC RBC-ENTMCNC: 28.6 PG
MCHC RBC-ENTMCNC: 31.1 GM/DL
MCV RBC AUTO: 92.2 FL
MONOCYTES # BLD AUTO: 0.71 K/UL
MONOCYTES NFR BLD AUTO: 11.2 %
NEUTROPHILS # BLD AUTO: 3.85 K/UL
NEUTROPHILS NFR BLD AUTO: 60.9 %
NONHDLC SERPL-MCNC: 149 MG/DL
PLATELET # BLD AUTO: 222 K/UL
POTASSIUM SERPL-SCNC: 5.1 MMOL/L
PROT SERPL-MCNC: 7.1 G/DL
RBC # BLD: 4.61 M/UL
RBC # FLD: 14.8 %
SODIUM SERPL-SCNC: 141 MMOL/L
T3FREE SERPL-MCNC: 2.88 PG/ML
T4 FREE SERPL-MCNC: 1 NG/DL
THYROGLOB AB SERPL-ACNC: <20 IU/ML
THYROPEROXIDASE AB SERPL IA-ACNC: 10.2 IU/ML
TRIGL SERPL-MCNC: 111 MG/DL
TSH SERPL-ACNC: 2.75 UIU/ML
VIT B12 SERPL-MCNC: 534 PG/ML
WBC # FLD AUTO: 6.32 K/UL

## 2023-05-24 NOTE — PHYSICAL EXAM
PIV insertion unsuccessful. Blanca WITT states heat packs were applied to bilateral upper extremities and will reattempt.   [No Acute Distress] : no acute distress [Well Nourished] : well nourished [Well Developed] : well developed [Well-Appearing] : well-appearing [Normal Sclera/Conjunctiva] : normal sclera/conjunctiva [PERRL] : pupils equal round and reactive to light [EOMI] : extraocular movements intact [Normal Outer Ear/Nose] : the outer ears and nose were normal in appearance [Normal Oropharynx] : the oropharynx was normal [No Lymphadenopathy] : no lymphadenopathy [No JVD] : no jugular venous distention [Supple] : supple [No Respiratory Distress] : no respiratory distress  [Thyroid Normal, No Nodules] : the thyroid was normal and there were no nodules present [No Accessory Muscle Use] : no accessory muscle use [Clear to Auscultation] : lungs were clear to auscultation bilaterally [Normal Rate] : normal rate  [Regular Rhythm] : with a regular rhythm [Normal S1, S2] : normal S1 and S2 [No Murmur] : no murmur heard [No Carotid Bruits] : no carotid bruits [No Abdominal Bruit] : a ~M bruit was not heard ~T in the abdomen [No Varicosities] : no varicosities [Pedal Pulses Present] : the pedal pulses are present [No Edema] : there was no peripheral edema [No Palpable Aorta] : no palpable aorta [No Extremity Clubbing/Cyanosis] : no extremity clubbing/cyanosis [Soft] : abdomen soft [Non Tender] : non-tender [No Masses] : no abdominal mass palpated [Non-distended] : non-distended [No HSM] : no HSM [Normal Posterior Cervical Nodes] : no posterior cervical lymphadenopathy [Normal Bowel Sounds] : normal bowel sounds [Normal Anterior Cervical Nodes] : no anterior cervical lymphadenopathy [No Joint Swelling] : no joint swelling [No Spinal Tenderness] : no spinal tenderness [No CVA Tenderness] : no CVA  tenderness [Grossly Normal Strength/Tone] : grossly normal strength/tone [No Rash] : no rash [No Focal Deficits] : no focal deficits [Coordination Grossly Intact] : coordination grossly intact [Normal Gait] : normal gait [Normal Affect] : the affect was normal [Deep Tendon Reflexes (DTR)] : deep tendon reflexes were 2+ and symmetric [Normal Insight/Judgement] : insight and judgment were intact

## 2023-05-30 ENCOUNTER — RX RENEWAL (OUTPATIENT)
Age: 78
End: 2023-05-30

## 2023-05-31 ENCOUNTER — RX RENEWAL (OUTPATIENT)
Age: 78
End: 2023-05-31

## 2023-06-19 ENCOUNTER — APPOINTMENT (OUTPATIENT)
Dept: INTERNAL MEDICINE | Facility: CLINIC | Age: 78
End: 2023-06-19

## 2023-06-23 ENCOUNTER — APPOINTMENT (OUTPATIENT)
Dept: INTERNAL MEDICINE | Facility: CLINIC | Age: 78
End: 2023-06-23
Payer: MEDICARE

## 2023-06-23 VITALS
TEMPERATURE: 98.4 F | HEIGHT: 65 IN | WEIGHT: 151 LBS | RESPIRATION RATE: 17 BRPM | HEART RATE: 94 BPM | SYSTOLIC BLOOD PRESSURE: 120 MMHG | OXYGEN SATURATION: 98 % | BODY MASS INDEX: 25.16 KG/M2 | DIASTOLIC BLOOD PRESSURE: 78 MMHG

## 2023-06-23 DIAGNOSIS — M48.00 SPINAL STENOSIS, SITE UNSPECIFIED: ICD-10-CM

## 2023-06-23 PROCEDURE — 96372 THER/PROPH/DIAG INJ SC/IM: CPT

## 2023-06-23 PROCEDURE — 99214 OFFICE O/P EST MOD 30 MIN: CPT | Mod: 25

## 2023-06-25 PROBLEM — M48.00 SPINAL STENOSIS: Status: ACTIVE | Noted: 2019-05-23

## 2023-06-25 NOTE — PHYSICAL EXAM
[No Acute Distress] : no acute distress [Well Nourished] : well nourished [Well Developed] : well developed [Well-Appearing] : well-appearing [Normal Voice/Communication] : normal voice/communication [Normal Sclera/Conjunctiva] : normal sclera/conjunctiva [PERRL] : pupils equal round and reactive to light [EOMI] : extraocular movements intact [Normal Outer Ear/Nose] : the outer ears and nose were normal in appearance [Normal Oropharynx] : the oropharynx was normal [Normal TMs] : both tympanic membranes were normal [Normal Nasal Mucosa] : the nasal mucosa was normal [No JVD] : no jugular venous distention [No Lymphadenopathy] : no lymphadenopathy [Supple] : supple [Thyroid Normal, No Nodules] : the thyroid was normal and there were no nodules present [No Respiratory Distress] : no respiratory distress  [No Accessory Muscle Use] : no accessory muscle use [Clear to Auscultation] : lungs were clear to auscultation bilaterally [Regular Rhythm] : with a regular rhythm [Normal Rate] : normal rate  [Normal S1, S2] : normal S1 and S2 [No Murmur] : no murmur heard [No Carotid Bruits] : no carotid bruits [No Abdominal Bruit] : a ~M bruit was not heard ~T in the abdomen [No Varicosities] : no varicosities [Pedal Pulses Present] : the pedal pulses are present [No Edema] : there was no peripheral edema [No Palpable Aorta] : no palpable aorta [No Extremity Clubbing/Cyanosis] : no extremity clubbing/cyanosis [Soft] : abdomen soft [Non Tender] : non-tender [Non-distended] : non-distended [No Masses] : no abdominal mass palpated [No HSM] : no HSM [Normal Supraclavicular Nodes] : no supraclavicular lymphadenopathy [Normal Bowel Sounds] : normal bowel sounds [Normal Posterior Cervical Nodes] : no posterior cervical lymphadenopathy [Normal Anterior Cervical Nodes] : no anterior cervical lymphadenopathy [No CVA Tenderness] : no CVA  tenderness [No Spinal Tenderness] : no spinal tenderness [Kyphosis] : kyphosis [Scoliosis] : scoliosis [No Joint Swelling] : no joint swelling [Grossly Normal Strength/Tone] : grossly normal strength/tone [No Rash] : no rash [Coordination Grossly Intact] : coordination grossly intact [No Focal Deficits] : no focal deficits [Normal Gait] : normal gait [Deep Tendon Reflexes (DTR)] : deep tendon reflexes were 2+ and symmetric [Speech Grossly Normal] : speech grossly normal [Normal Affect] : the affect was normal [Memory Grossly Normal] : memory grossly normal [Alert and Oriented x3] : oriented to person, place, and time [Normal Mood] : the mood was normal [Normal Insight/Judgement] : insight and judgment were intact [Acne] : no acne

## 2023-06-25 NOTE — END OF VISIT
[FreeTextEntry4] : Recorded by Shahbaz Osman acting as a scribe for  (6/23/23) [Time Spent: ___ minutes] : I have spent [unfilled] minutes of time on the encounter.

## 2023-06-25 NOTE — REVIEW OF SYSTEMS
[Negative] : Heme/Lymph [Incontinence] : incontinence [Joint Pain] : joint pain [Joint Stiffness] : joint stiffness [Back Pain] : back pain [Unsteady Walking] : ataxia

## 2023-06-25 NOTE — PLAN
[FreeTextEntry1] : Endocrinology/Musculoskeletal\par osteoporosis - continue Vitamin D-3 1000 IU p.o.q.d. with a meal as directed; continue weight-bearing exercise\par Prolia injection administered in office at the left tricept area; RTO in 6 months \par Musculoskeletal\par chronic back/neck pain - secondary to spinal stenosis - continue Pregabalin (Lyrica) 75mg TID p.o.q.d. as directed,  previously advised patient to take the third dose of the medication shortly before going to sleep\par arm pain - continue Voltaren (Diclofenac) Gel as recommended by neurologist, Dr. Lee \par gait instability - continue PT at [facility]; previously advised caution when ambulating \par Neurology\par history of MS - continue PT; continue exercises recommended by neurologist, Dr. Lee\par ENT/Immunology\par seasonal allergies - continue Montelukast Sodium (Singulair) 10mg p.o.q.d. as directed; continue Zyrtec p.o. as directed\par Cardiology\par hyperlipidemia - continue low cholesterol/low fat diet \par Endocrinology\par hyperglycemia (prediabetes) - continue Metformin HCl ER 500mg p.o.q.d. as directed- continue low carbohydrate/low sugar diet \par history of Vitamin D insufficiency - continue Vitamin D-3 1000 IU p.o.q.d. with a meal as directed\par Vitamin B12 deficiency - continue OTC Vitamin B12 1000mcg p.o.q.d. as directed \par Integumentary\par psoriasis of scalp - continue Clobetasol Propionate 0.05% External Solution: apply to affected area BID q.d. as directed - continue to follow up with dermatology \par Psychiatry\par Continue Venlafaxine HCl 25mg TID p.o.q.d. as directed \par Urology\par chronic UTIs - continue Methenamine Hippurate 1 GM p.o.q.d. as directed and OTC Cranberry 405mg BID p.o.q.d.\par incontinence/overactive bladder - continue Trospium Chloride ER 60mg p.o.q.d. as directed\par Continue to follow up with urologist, Dr. Epps

## 2023-06-25 NOTE — ASSESSMENT
[FreeTextEntry1] : Patient is a 78 year old female with a past medical history as above who presents for follow-up and prolia injection.

## 2023-06-25 NOTE — HISTORY OF PRESENT ILLNESS
[FreeTextEntry1] : Follow-up [de-identified] : Patient is a 77 year old female with a past medical history as below who presents for follow-up and Prolia injection.\par \par Patient states she is taking all medications as prescribed and denies any adverse reactions or side effects. She denies gastrointestinal issues on Metformin. Patient receives Prolia injection every 6 months secondary to osteoporosis. Last calcium and vitamin D levels WNL 5/8/23.\par \par Patient received the influenza vaccine for the [id number] season and most recent Covid-19 booster. She is unsure if she received shingles vaccine.

## 2023-07-13 ENCOUNTER — RX RENEWAL (OUTPATIENT)
Age: 78
End: 2023-07-13

## 2023-07-18 ENCOUNTER — APPOINTMENT (OUTPATIENT)
Dept: ORTHOPEDIC SURGERY | Facility: CLINIC | Age: 78
End: 2023-07-18
Payer: MEDICARE

## 2023-07-18 PROCEDURE — 99203 OFFICE O/P NEW LOW 30 MIN: CPT

## 2023-07-18 PROCEDURE — 73030 X-RAY EXAM OF SHOULDER: CPT | Mod: LT

## 2023-07-20 NOTE — PHYSICAL EXAM
[de-identified] : Oriented to time, place, person\par Mood: Normal\par Affect: Normal\par Appearance: Healthy, well appearing, no acute distress.\par Gait: Normal\par Assistive Devices: None \par \par Left shoulder exam\par \par Inspection: No malalignment, No defects, No atrophy\par Skin: No masses, No lesions\par Neck: Negative Spurling's, full ROM, no pain with ROM\par AROM: FF to 70, abduction to 45, ER to 10, IR to hip\par Painful arc ROM: Pain with further motion\par Tenderness: mild bicipital tenderness, no tenderness to the greater tuberosity/RTC insertion, mild anterior shoulder/lesser tuberosity tenderness\par Strength: 4 ER, 4/5 IR in adduction, 4/5 supraspinatus testing, +Amador's test\par AC Joint: mild  ttp/pain with cross arm testing\par Biceps: Speed Negative, Yergusons Negative\par Impingement test: positive Munoz, Negative Neer \par Stability: Stable\par Vasc: 2+ radial pulse\par Neuro: AIN, PIN, Ulnar nerve intact to motor\par Sensation: Intact to light touch throughout [de-identified] : The following radiographs were ordered and read by me during this patients visit. I reviewed each radiograph in detail with the patient and discussed the findings as highlighted below.\par \par 3 views of left shoulder were obtained today, 07/18/2023, that show no acute fracture or dislocation. There is moderate glenohumeral and moderate AC joint degenerative change seen. Type II acromion. There is no significant malalignment. No significant other obvious osseous abnormality, otherwise unremarkable.

## 2023-07-20 NOTE — ADDENDUM
[FreeTextEntry1] : This note was written by Angela Nicolas on 07/18/2023 acting solely as a scribe for Dr. Edmond Burton.\par \par All medical record entries made by the Scribe were at my, Dr. Edmond Burton, direction and personally dictated by me on 07/18/2023. I have personally reviewed the chart and agree that the record accurately reflects my personal performance of the history, physical exam, assessment and plan.

## 2023-07-20 NOTE — HISTORY OF PRESENT ILLNESS
[de-identified] : 78 year old  RHD female history of MS/DM presents today with left shoulder pain x 3 months. Patient gardens a lot. The pain is constant worse with elevation of the arm. She is taking Lyrica. s/p left shoulder arthroscopy many years unsure what kind of surgery. Surgery was done by Dr. Marrero. \par \par The patient's past medical history, past surgical history, medications and allergies were reviewed by me today with the patient and documented accordingly. In addition, the patient's family and social history, which were noncontributory to this visit, were reviewed also.

## 2023-07-20 NOTE — DISCUSSION/SUMMARY
[de-identified] : 79 y/o female with left shoulder osteoarthritis. \par \par I discussed the treatment of degenerative shoulder arthritis with the patient at length today. I described the spectrum of treatment from nonoperative modalities to total joint arthroplasty. Noninvasive and nonoperative treatment modalities include activity modification with avoidance of overhead activity/excessive glenohumeral rotation, PRN use of acetaminophen or anti-inflammatory medication if tolerated, and physical therapy. Additional treatment can include intermittent corticosteroid injection for acute pain relief. Possible use of visco-supplementation also.\par \par Definitive treatment of total joint arthroplasty would be considered for failure of conservative management, progressive pain, and ultimate inability to perform ADL's. Discussed consideration between TSA and revTSA depending on the quality and condition of the rotator cuff tendon. Short-term and long-term outcomes were discussed as well as the potential need for revision arthroplasty. \par \par The risks and benefits of each treatment options was discussed and all questions were answered.  Patient is not interested in surgical arthroplasty, so recommendation is for conservative care & observation, this includes rest/activity avoidance, OTC NSAID's or acetaminophen as tolerated, with application of ice to the area 2-3x daily for 20 minutes after periods of activity. \par \par Follow up as needed if symptoms persist.

## 2023-09-20 ENCOUNTER — APPOINTMENT (OUTPATIENT)
Dept: INTERNAL MEDICINE | Facility: CLINIC | Age: 78
End: 2023-09-20
Payer: MEDICARE

## 2023-09-20 VITALS
BODY MASS INDEX: 24.99 KG/M2 | HEIGHT: 65 IN | RESPIRATION RATE: 16 BRPM | DIASTOLIC BLOOD PRESSURE: 70 MMHG | HEART RATE: 92 BPM | WEIGHT: 150 LBS | TEMPERATURE: 98.6 F | SYSTOLIC BLOOD PRESSURE: 122 MMHG | OXYGEN SATURATION: 95 %

## 2023-09-20 DIAGNOSIS — R26.81 UNSTEADINESS ON FEET: ICD-10-CM

## 2023-09-20 DIAGNOSIS — G35 MULTIPLE SCLEROSIS: ICD-10-CM

## 2023-09-20 PROCEDURE — 36415 COLL VENOUS BLD VENIPUNCTURE: CPT

## 2023-09-20 PROCEDURE — 99214 OFFICE O/P EST MOD 30 MIN: CPT | Mod: 25

## 2023-09-24 LAB
25(OH)D3 SERPL-MCNC: 44.2 NG/ML
ALBUMIN SERPL ELPH-MCNC: 4.4 G/DL
ALP BLD-CCNC: 57 U/L
ALT SERPL-CCNC: 9 U/L
ANION GAP SERPL CALC-SCNC: 12 MMOL/L
AST SERPL-CCNC: 13 U/L
BILIRUB SERPL-MCNC: 0.4 MG/DL
BUN SERPL-MCNC: 17 MG/DL
CALCIUM SERPL-MCNC: 9.7 MG/DL
CHLORIDE SERPL-SCNC: 104 MMOL/L
CHOLEST SERPL-MCNC: 218 MG/DL
CO2 SERPL-SCNC: 26 MMOL/L
CREAT SERPL-MCNC: 0.71 MG/DL
EGFR: 86 ML/MIN/1.73M2
ESTIMATED AVERAGE GLUCOSE: 131 MG/DL
GLUCOSE SERPL-MCNC: 109 MG/DL
HBA1C MFR BLD HPLC: 6.2 %
HDLC SERPL-MCNC: 75 MG/DL
LDLC SERPL CALC-MCNC: 124 MG/DL
NONHDLC SERPL-MCNC: 142 MG/DL
POTASSIUM SERPL-SCNC: 4.4 MMOL/L
PROT SERPL-MCNC: 7.4 G/DL
SODIUM SERPL-SCNC: 142 MMOL/L
TRIGL SERPL-MCNC: 105 MG/DL
VIT B12 SERPL-MCNC: 976 PG/ML

## 2023-10-09 ENCOUNTER — EMERGENCY (EMERGENCY)
Facility: HOSPITAL | Age: 78
LOS: 1 days | Discharge: HOME | End: 2023-10-09
Attending: EMERGENCY MEDICINE | Admitting: EMERGENCY MEDICINE
Payer: MEDICARE

## 2023-10-09 VITALS
DIASTOLIC BLOOD PRESSURE: 75 MMHG | HEART RATE: 90 BPM | SYSTOLIC BLOOD PRESSURE: 114 MMHG | WEIGHT: 149.03 LBS | OXYGEN SATURATION: 93 % | HEIGHT: 66 IN | RESPIRATION RATE: 18 BRPM | TEMPERATURE: 98 F

## 2023-10-09 PROCEDURE — 73030 X-RAY EXAM OF SHOULDER: CPT | Mod: 26,LT

## 2023-10-09 PROCEDURE — 73030 X-RAY EXAM OF SHOULDER: CPT

## 2023-10-09 PROCEDURE — 99283 EMERGENCY DEPT VISIT LOW MDM: CPT

## 2023-10-09 PROCEDURE — 99284 EMERGENCY DEPT VISIT MOD MDM: CPT | Mod: FS

## 2023-10-09 RX ORDER — VENLAFAXINE HCL 75 MG
1 CAPSULE, EXT RELEASE 24 HR ORAL
Refills: 0 | DISCHARGE

## 2023-10-09 RX ORDER — DENOSUMAB 60 MG/ML
60 INJECTION SUBCUTANEOUS
Refills: 0 | DISCHARGE

## 2023-10-09 RX ORDER — METFORMIN HYDROCHLORIDE 850 MG/1
1 TABLET ORAL
Refills: 0 | DISCHARGE

## 2023-10-09 RX ORDER — MONTELUKAST 4 MG/1
1 TABLET, CHEWABLE ORAL
Refills: 0 | DISCHARGE

## 2023-10-09 RX ORDER — METHENAMINE MANDELATE 1 G
1 TABLET ORAL
Refills: 0 | DISCHARGE

## 2023-10-09 NOTE — ED PROVIDER NOTE - DIFFERENTIAL DIAGNOSIS
Differentials include but not limited to calcific tendinitis, rotator cuff injury,  sprain, strain, fracture, dislocation Differential Diagnosis

## 2023-10-09 NOTE — ED PROVIDER NOTE - MUSCULOSKELETAL, MLM
localized tenderness to left shoulder with limited ROM. no obvious deformity. no elbow or wrist tenderness

## 2023-10-09 NOTE — ED PROVIDER NOTE - PROGRESS NOTE DETAILS
Patient stable.  X-ray results discussed with patient and family.  Discussed possible calcification versus acute fracture.  Sling provided by nurse.  Post application shows good alignment, neurovascular intact.   will follow-up with orthopedics, referral provided.  Declines any pain medication emergency room.  Will continue Tylenol over-the-counter for pain.

## 2023-10-09 NOTE — ED PROVIDER NOTE - CARE PROVIDER_API CALL
Lawrence Davis  Orthopaedic Surgery  [address] [place] [address]  Phone: [phone]  Fax: [phone]  Follow Up Time: 1-3 Days

## 2023-10-09 NOTE — ED PROVIDER NOTE - PATIENT PORTAL LINK FT
You can access the FollowMyHealth Patient Portal offered by [facility] by registering at the following website: [email or web address]. By joining [facility]’s FollowMyHealth portal, you will also be able to view your health information using other applications (apps) compatible with our system.

## 2023-10-09 NOTE — ED ADULT NURSE NOTE - OBJECTIVE STATEMENT
78yr old female walked into ED c/o left shoulder pain today; pt has chronic left shoulder pain where she recently got an injection; pt sneezed today and felt pain to left shoulder; limited range of motion

## 2023-10-09 NOTE — ED PROVIDER NOTE - NSFOLLOWUPINSTRUCTIONS_ED_ALL_ED_FT
Tylenol over-the-counter every 6 hours as needed for pain  Ice  Sling  Follow-up with orthopedics, referral provided if needed      Shoulder Sprain  Body outline showing the skeleton, with a close-up of ligaments in the shoulder.  A shoulder sprain is a partial or complete tear in one of the tough, fiber-like tissues (ligaments) in the shoulder. The ligaments in the shoulder help to hold the shoulder in place.    What are the causes?  This condition may be caused by:  A fall.  A hit to the shoulder.  A twist of the arm.  What increases the risk?  You are more likely to develop this condition if you:  Play sports.  Have problems with balance or coordination.  What are the signs or symptoms?  Symptoms of this condition include:  Pain when moving the shoulder.  Limited ability to move the shoulder.  Swelling and tenderness on top of the shoulder.  Warmth in the shoulder.  A change in the shape of the shoulder.  Redness or bruising on the shoulder.  How is this diagnosed?  This condition is diagnosed with:  A physical exam. During the exam, you may be asked to do simple exercises with your shoulder.  Imaging tests such as X-rays, MRI, or a CT scan. These tests can show how severe the sprain is.  How is this treated?  This condition may be treated with:  Rest.  Pain medicine.  Ice.  A sling or brace. This is used to keep the arm still while the shoulder is healing.  Physical therapy or rehabilitation exercises. These help to improve the range of motion and strength of the shoulder.  Surgery (rare). Surgery may be needed if the sprain caused a joint to become unstable. Surgery may also be needed to reduce pain.  Some people may develop ongoing shoulder pain or lose some range of motion in the shoulder. However, most people do not develop long-term problems.    Follow these instructions at home:  Bag of ice on a towel on the skin.  If you have a removable sling or brace:    Wear the sling or brace as told by your health care provider. Remove it only as told by your provider.  Check the skin around the sling or brace every day. Tell your provider about any concerns.  Loosen the sling or brace if your fingers tingle, become numb, or turn cold and blue.  Keep the sling or brace clean.  If the sling or brace is not waterproof:  Do not let it get wet.  Remove the sling or brace before taking a bath or shower, as told by your provider.  Activity    Rest your shoulder.  Move your arm only as much as told by your provider, but move your hand and fingers often to prevent stiffness and swelling.  Return to your normal activities as told by your provider. Ask your provider what activities are safe for you.  Ask your provider when it is safe for you to drive if you have a sling or brace on your shoulder.  If you were shown how to do any exercises, do them as told by your provider.  General instructions    If told, put ice on the affected area.  If you have a removable sling or brace, remove it as told by your provider.  Put ice in a plastic bag.  Place a towel between your skin and the bag.  Leave the ice on for 20 minutes, 2–3 times a day.  If your skin turns bright red, remove the ice right away to prevent skin damage. The risk of damage is higher if you cannot feel pain, heat, or cold.  Take over-the-counter and prescription medicines only as told by your provider.  Do not use any products that contain nicotine or tobacco. These products include cigarettes, chewing tobacco, and vaping devices, such as e-cigarettes. These can delay healing. If you need help quitting, ask your provider.  Keep all follow-up visits. Your provider will monitor your injury and activity level.  Contact a health care provider if:  Your pain gets worse.  Your pain is not relieved with medicines.  You have increased redness or swelling.  You have a fever.  Get help right away if:  You cannot move your arm or shoulder.  You develop severe numbness or tingling in your arm, hand, or fingers.  Your arm, hand, or fingers feel cold and turn blue, white, or gray.  This information is not intended to replace advice given to you by your health care provider. Make sure you discuss any questions you have with your health care provider.

## 2023-10-09 NOTE — ED PROVIDER NOTE - CLINICAL SUMMARY MEDICAL DECISION MAKING FREE TEXT BOX
attg note: 78 y.o. F c/o pain left shoulder, has chronic left shoulder pain, tonight exacerbated by sneeze, worried about possible dislocation, a lot of pain with movement, no paresthesia; on exam pt wd, wn, nad; left UE no deformity, able to range shoulder, though some limit with flex/abduction, NVI, skin intact; MDM will xr, does not appear dislocated, unlikely to require procedure, has an ortho for f/u, will reassess after xr

## 2023-10-09 NOTE — ED ADULT NURSE NOTE - NSSUHOSCREENINGYN_ED_ALL_ED
LENGTH OF HOSPITAL STAY: 22d    CHIEF COMPLAINT:   Patient is a 56y old  Male who presents with a chief complaint of suspected COVID-19 (05 May 2020 08:45)      HISTORY OF PRESENTING ILLNESS:    HPI:  56 year old male patient with hypertension, diabetes presenting for dyspnea.   The patient states that he has experienced non productive cough as well as low grade fever (between 90 and 100) for the past week associated with loss of appetite. These symptoms stopped last Friday. Since yesterday he has been experiencing dyspnea mostly on exertion that has been worsening which prompted him to call EMS today. His son works at a rehab center, has been experiencing the same symptoms however has been denied COVID-19 testing   In the ED, SpO2 was 78 % on room air and the patient was placed on 6 L NC with improvement of his SpO2 to 90 - 92 % and subjective improvement endorsed by the patient as well. Otherwise, tachycardic to 104, hemodynamically stable and afebrile. Chest  x ray was done and showed bilateral infiltrates (official report pending), SARS-CoV-2 PCR sent and patient was admitted for suspected COVID-19 (13 Apr 2020 15:47)    PAST MEDICAL & SURGICAL HISTORY  PAST MEDICAL & SURGICAL HISTORY:  Diabetes  Hypertension  No significant past surgical history    SOCIAL HISTORY:    ALLERGIES:  No Known Allergies    MEDICATIONS:  STANDING MEDICATIONS  aspirin  chewable 81 milliGRAM(s) Oral daily  calcium acetate 2001 milliGRAM(s) Oral three times a day with meals  chlorhexidine 0.12% Liquid 15 milliLiter(s) Oral Mucosa every 12 hours  chlorhexidine 4% Liquid 1 Application(s) Topical <User Schedule>  furosemide   Injectable 40 milliGRAM(s) IV Push two times a day  insulin regular Infusion 3 Unit(s)/Hr IV Continuous <Continuous>  lactulose Syrup 20 Gram(s) Oral every 12 hours  meropenem  IVPB 750 milliGRAM(s) IV Intermittent every 24 hours  methylPREDNISolone sodium succinate Injectable 60 milliGRAM(s) IV Push two times a day  midazolam Infusion 0.02 mG/kG/Hr IV Continuous <Continuous>  norepinephrine Infusion 0.05 MICROgram(s)/kG/Min IV Continuous <Continuous>  pantoprazole   Suspension 40 milliGRAM(s) Oral daily  rocuronium Injectable 50 milliGRAM(s) IV Push once  senna 2 Tablet(s) Oral at bedtime  sevelamer carbonate Powder 2400 milliGRAM(s) Oral three times a day with meals  sodium bicarbonate 650 milliGRAM(s) Oral every 12 hours    PRN MEDICATIONS  acetaminophen   Tablet .. 650 milliGRAM(s) Oral every 6 hours PRN  ondansetron Injectable 4 milliGRAM(s) IV Push every 8 hours PRN    VITALS:   T(F): 99.5  HR: 124  BP: --  RR: 27  SpO2: 98%    LABS:                        7.2    12.64 )-----------( 155      ( 05 May 2020 04:05 )             23.6     05-05    145  |  103  |  148<HH>  ----------------------------<  254<H>  3.7   |  25  |  4.6<HH>    Ca    7.9<L>      05 May 2020 04:40  Mg     2.7     05-04    TPro  5.5<L>  /  Alb  1.8<L>  /  TBili  0.4  /  DBili  x   /  AST  102<H>  /  ALT  97<H>  /  AlkPhos  188<H>  05-05    PTT - ( 03 May 2020 20:14 )  PTT:67.7 sec    ABG - ( 05 May 2020 01:51 )  pH, Arterial: 7.24  pH, Blood: x     /  pCO2: 60    /  pO2: 124   / HCO3: 26    / Base Excess: -1.8  /  SaO2: 99                        RADIOLOGY:  < from: [facility] Duplex Lower Ext Vein Scan, Bilat (05.04.20 @ 12:41) >  Limited study.    No evidence of DVT in the bilateral popliteal and gastrocnemius veins    < end of copied text >    PHYSICAL EXAM:  GEN: Patient sedated and ventilated and paralyzed  HEENT: AT, NC   LUNGS: decreased heart sounds  HEART: S1/S2 present. RRR.   ABD: Soft, non-tender, non-distended. Bowel sounds present  EXT: edematous, Cyanosis/clubbing absent  NEURO: unable to evaluate Yes - the patient is able to be screened

## 2023-10-09 NOTE — ED ADULT NURSE NOTE - NSFALLHARMRISKINTERV_ED_ALL_ED

## 2023-10-09 NOTE — ED ADULT NURSE NOTE - CAS ELECT INFOMATION PROVIDED
DC instructions Isotretinoin Pregnancy And Lactation Text: This medication is Pregnancy Category X and is considered extremely dangerous during pregnancy. It is unknown if it is excreted in breast milk.

## 2023-10-09 NOTE — ED PROVIDER NOTE - NS ED ATTENDING STATEMENT MOD
This was a shared visit with the DELVIN. I reviewed and verified the documentation and independently performed the documented:

## 2023-10-09 NOTE — ED PROVIDER NOTE - OBJECTIVE STATEMENT
78-year-old female with history of multiple sclerosis presents with left shoulder pain.  Patient states she has had chronic left shoulder pain since summer 2022.  Has had x-rays and MRIs of her shoulder.  Was seen by pain management 3 weeks ago and had injection to left shoulder.  Believes it was making her shoulder pain better.  Patient states she was sneezing today and felt sudden pain to left shoulder and is concerned about possible dislocation.  Has limited overhead range of motion.  No elbow or wrist pain.  No numbness or tingling.  Denies other injuries or complaints.  Patient is right-handed.  No pain at rest, pain increases to 8 out of 10 with overhead movement.  Took 2 Tylenol prior to arrival with some mild improvement of pain.  PCP Emerson Smith

## 2023-10-12 ENCOUNTER — APPOINTMENT (OUTPATIENT)
Dept: ORTHOPEDIC SURGERY | Facility: CLINIC | Age: 78
End: 2023-10-12
Payer: MEDICARE

## 2023-10-12 VITALS
HEART RATE: 93 BPM | HEIGHT: 66 IN | BODY MASS INDEX: 23.95 KG/M2 | WEIGHT: 149 LBS | DIASTOLIC BLOOD PRESSURE: 69 MMHG | SYSTOLIC BLOOD PRESSURE: 115 MMHG

## 2023-10-12 PROCEDURE — 99213 OFFICE O/P EST LOW 20 MIN: CPT

## 2023-10-16 PROBLEM — G35 MULTIPLE SCLEROSIS: Chronic | Status: ACTIVE | Noted: 2023-10-09

## 2023-10-17 ENCOUNTER — APPOINTMENT (OUTPATIENT)
Dept: INTERNAL MEDICINE | Facility: CLINIC | Age: 78
End: 2023-10-17

## 2023-10-29 ENCOUNTER — RX RENEWAL (OUTPATIENT)
Age: 78
End: 2023-10-29

## 2023-11-17 ENCOUNTER — OUTPATIENT (OUTPATIENT)
Dept: OUTPATIENT SERVICES | Facility: HOSPITAL | Age: 78
LOS: 1 days | End: 2023-11-17
Payer: MEDICARE

## 2023-11-17 ENCOUNTER — APPOINTMENT (OUTPATIENT)
Dept: RADIOLOGY | Facility: CLINIC | Age: 78
End: 2023-11-17
Payer: MEDICARE

## 2023-11-17 DIAGNOSIS — M81.0 AGE-RELATED OSTEOPOROSIS WITHOUT CURRENT PATHOLOGICAL FRACTURE: ICD-10-CM

## 2023-11-17 PROCEDURE — 77080 DXA BONE DENSITY AXIAL: CPT | Mod: 26

## 2023-11-17 PROCEDURE — 77080 DXA BONE DENSITY AXIAL: CPT

## 2023-11-27 ENCOUNTER — APPOINTMENT (OUTPATIENT)
Dept: DERMATOLOGY | Facility: CLINIC | Age: 78
End: 2023-11-27

## 2023-11-28 ENCOUNTER — RX RENEWAL (OUTPATIENT)
Age: 78
End: 2023-11-28

## 2023-12-01 ENCOUNTER — RX RENEWAL (OUTPATIENT)
Age: 78
End: 2023-12-01

## 2023-12-05 ENCOUNTER — NON-APPOINTMENT (OUTPATIENT)
Age: 78
End: 2023-12-05

## 2023-12-06 RX ORDER — OXYCODONE AND ACETAMINOPHEN 10; 325 MG/1; MG/1
10-325 TABLET ORAL
Qty: 120 | Refills: 0 | Status: DISCONTINUED | COMMUNITY
Start: 2023-11-09 | End: 2023-12-06

## 2023-12-06 RX ORDER — MELOXICAM 15 MG/1
15 TABLET ORAL
Qty: 90 | Refills: 0 | Status: DISCONTINUED | COMMUNITY
Start: 2023-04-03 | End: 2023-12-06

## 2023-12-18 RX ORDER — NIRMATRELVIR AND RITONAVIR 300-100 MG
20 X 150 MG & KIT ORAL
Qty: 1 | Refills: 0 | Status: DISCONTINUED | COMMUNITY
Start: 2023-12-06 | End: 2023-12-18

## 2023-12-19 ENCOUNTER — APPOINTMENT (OUTPATIENT)
Dept: INTERNAL MEDICINE | Facility: CLINIC | Age: 78
End: 2023-12-19
Payer: MEDICARE

## 2023-12-19 VITALS
HEIGHT: 66 IN | HEART RATE: 92 BPM | BODY MASS INDEX: 23.95 KG/M2 | OXYGEN SATURATION: 96 % | WEIGHT: 149 LBS | RESPIRATION RATE: 16 BRPM | DIASTOLIC BLOOD PRESSURE: 70 MMHG | SYSTOLIC BLOOD PRESSURE: 112 MMHG | TEMPERATURE: 97 F

## 2023-12-19 DIAGNOSIS — U07.1 COVID-19: ICD-10-CM

## 2023-12-19 DIAGNOSIS — N39.0 URINARY TRACT INFECTION, SITE NOT SPECIFIED: ICD-10-CM

## 2023-12-19 PROCEDURE — 99214 OFFICE O/P EST MOD 30 MIN: CPT | Mod: 25

## 2023-12-19 PROCEDURE — 36415 COLL VENOUS BLD VENIPUNCTURE: CPT

## 2023-12-21 ENCOUNTER — NON-APPOINTMENT (OUTPATIENT)
Age: 78
End: 2023-12-21

## 2023-12-21 PROBLEM — U07.1 COVID-19 VIRUS INFECTION: Status: ACTIVE | Noted: 2023-12-06

## 2023-12-21 PROBLEM — N39.0 CHRONIC UTI (URINARY TRACT INFECTION): Status: ACTIVE | Noted: 2019-02-22

## 2023-12-21 LAB
25(OH)D3 SERPL-MCNC: 39.9 NG/ML
CHOLEST SERPL-MCNC: 210 MG/DL
ESTIMATED AVERAGE GLUCOSE: 120 MG/DL
HBA1C MFR BLD HPLC: 5.8 %
HDLC SERPL-MCNC: 65 MG/DL
LDLC SERPL CALC-MCNC: 123 MG/DL
NONHDLC SERPL-MCNC: 146 MG/DL
TRIGL SERPL-MCNC: 127 MG/DL
VIT B12 SERPL-MCNC: 1176 PG/ML

## 2023-12-21 NOTE — HISTORY OF PRESENT ILLNESS
[FreeTextEntry1] : fasting blood work and general follow-up [de-identified] : Patient is a 78-year-old female with a past medical history as below who presents for fasting blood work and general follow-up. Patient states she is taking all medications as prescribed and denies any adverse reactions or side effects. She denies gastrointestinal issues on Metformin which she is taking for hyperglycemia. 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 She had consulted an oral surgeon Dr. Quijano who had attributed her symptoms to Prolia use. She had an oral procedure done to remove the infection and was started a month-long antibiotic course which she has completed at this time. She currently reports improvement in symptoms. Endocrinology consultation is pending (January of 2024). to maximize clinical therapy without using Bisphosphonate class of medications. Last calcium and vitamin D levels WNL 09/20/2023. She notes a recent COVID infection. She denies any fever or chills. Symptoms had resolved following Paxlovid use. Patient reports limiting carbs and sugar in her diet and exercising regularly (walking, chair-exercise classes).

## 2023-12-21 NOTE — PHYSICAL EXAM
[No Acute Distress] : no acute distress [Well Nourished] : well nourished [Well Developed] : well developed [Well-Appearing] : well-appearing [Normal Sclera/Conjunctiva] : normal sclera/conjunctiva [PERRL] : pupils equal round and reactive to light [EOMI] : extraocular movements intact [Normal Outer Ear/Nose] : the outer ears and nose were normal in appearance [Normal Oropharynx] : the oropharynx was normal [No JVD] : no jugular venous distention [No Lymphadenopathy] : no lymphadenopathy [Supple] : supple [Thyroid Normal, No Nodules] : the thyroid was normal and there were no nodules present [No Respiratory Distress] : no respiratory distress  [No Accessory Muscle Use] : no accessory muscle use [Clear to Auscultation] : lungs were clear to auscultation bilaterally [Normal Rate] : normal rate  [Regular Rhythm] : with a regular rhythm [Normal S1, S2] : normal S1 and S2 [No Murmur] : no murmur heard [No Carotid Bruits] : no carotid bruits [No Abdominal Bruit] : a ~M bruit was not heard ~T in the abdomen [No Varicosities] : no varicosities [Pedal Pulses Present] : the pedal pulses are present [No Edema] : there was no peripheral edema [No Palpable Aorta] : no palpable aorta [No Extremity Clubbing/Cyanosis] : no extremity clubbing/cyanosis [Normal] : no carotid or abdominal bruits heard, no varicosities, pedal pulses are present, no peripheral edema, no extremity clubbing or cyanosis and no palpable aorta [Soft] : abdomen soft [Non Tender] : non-tender [Non-distended] : non-distended [No Masses] : no abdominal mass palpated [No HSM] : no HSM [Normal Bowel Sounds] : normal bowel sounds [Normal Posterior Cervical Nodes] : no posterior cervical lymphadenopathy [Normal Anterior Cervical Nodes] : no anterior cervical lymphadenopathy [No CVA Tenderness] : no CVA  tenderness [No Spinal Tenderness] : no spinal tenderness [No Joint Swelling] : no joint swelling [Grossly Normal Strength/Tone] : grossly normal strength/tone [No Rash] : no rash [Coordination Grossly Intact] : coordination grossly intact [No Focal Deficits] : no focal deficits [Normal Gait] : normal gait [Deep Tendon Reflexes (DTR)] : deep tendon reflexes were 2+ and symmetric [Normal Affect] : the affect was normal [Normal Insight/Judgement] : insight and judgment were intact [Normal Voice/Communication] : normal voice/communication [Normal TMs] : both tympanic membranes were normal [Normal Nasal Mucosa] : the nasal mucosa was normal [Normal Supraclavicular Nodes] : no supraclavicular lymphadenopathy [Kyphosis] : kyphosis [Scoliosis] : no scoliosis [Acne] : no acne [Speech Grossly Normal] : speech grossly normal [Memory Grossly Normal] : memory grossly normal [Alert and Oriented x3] : oriented to person, place, and time [Normal Mood] : the mood was normal

## 2023-12-21 NOTE — PLAN
[FreeTextEntry1] : Endocrinology/Musculoskeletal osteoporosis - continue Vitamin D-3 1000 IU p.o.q.d. with a meal as directed; continue weight-bearing exercise - patient to follow up with endocrinology to discuss treatment options without using Bisphosphonate class of medications Musculoskeletal left shoulder pain, rotator cuff tear-c/w Pregabalin 75mg (Lyrica) up to 3x a day p.o.q.d. as directed, Rx filled,  reviewed, Reference # [id number]; s/p cortisone injection by physiatrist, Dr. Reynolds gait instability - previously advised caution when ambulating Neurology history of MS - continue exercises recommended by neurologist, Dr. Lee ENT/Immunology seasonal allergies - continue Montelukast Sodium (Singulair) 10mg p.o.q.d. as directed; continue Zyrtec p.o. as directed Cardiology hyperlipidemia - continue low cholesterol/low fat diet, check FLP Endocrinology hyperglycemia (prediabetes) - continue Metformin HCl ER 500mg BID p.o.q.d. as directed; discussed altering dosage pending lab results - continue low carbohydrate/low sugar diet -check A1C history of Vitamin D insufficiency - continue Vitamin D-3 1000 IU p.o.q.d. with a meal as directed-check Vit D level Vitamin B12 deficiency - continue OTC Vitamin B12 1000mcg p.o.q.d. as directed -check vit B 12 level Integumentary psoriasis of scalp - continue Clobetasol Propionate 0.05% External Solution: apply to affected area BID q.d. as directed - continue to follow up with dermatology Psychiatry depression - continue Venlafaxine HCl 25mg TID p.o.q.d. as directed Urology chronic UTIs - continue Methenamine Hippurate 1 GM p.o.q.d. as directed, Rx filled, and OTC Cranberry 405mg BID p.o.q.d. incontinence/overactive bladder - continue Trospium Chloride ER 60mg p.o.q.d. as directed Continue to follow up with urologist, Dr. Epps.   check hemoglobin A1C, FLP, Vitamin B12, and Vitamin D

## 2023-12-21 NOTE — ASSESSMENT
[FreeTextEntry1] : Patient is a 78-year-old female with a past medical history as above who presents for fasting blood work and general follow-up.

## 2023-12-21 NOTE — ADDENDUM
[FreeTextEntry1] : I, Angelicasantana Chowdhurymonique, acted solely as scribe for Dr. Emerson Smith DO on this date 12/19/2023 .  All medical record entries made by the Scribe were at my, Dr. Emerson Smith DO direction and personally dictated by me on 12/19/2023. I have reviewed the chart and agree that the record accurately reflects my personal performance of the history, physical exam, assessment and plan. I have also personally directed, reviewed and agreed with the chart.

## 2024-01-15 ENCOUNTER — LABORATORY RESULT (OUTPATIENT)
Age: 79
End: 2024-01-15

## 2024-01-15 ENCOUNTER — APPOINTMENT (OUTPATIENT)
Dept: ENDOCRINOLOGY | Facility: CLINIC | Age: 79
End: 2024-01-15
Payer: MEDICARE

## 2024-01-15 VITALS
SYSTOLIC BLOOD PRESSURE: 101 MMHG | OXYGEN SATURATION: 93 % | BODY MASS INDEX: 24.43 KG/M2 | HEIGHT: 66 IN | WEIGHT: 152 LBS | HEART RATE: 95 BPM | DIASTOLIC BLOOD PRESSURE: 64 MMHG

## 2024-01-15 PROCEDURE — 99205 OFFICE O/P NEW HI 60 MIN: CPT

## 2024-01-15 RX ORDER — MONTELUKAST 10 MG/1
10 TABLET, FILM COATED ORAL
Qty: 90 | Refills: 1 | Status: DISCONTINUED | COMMUNITY
Start: 2021-08-30 | End: 2024-01-15

## 2024-01-15 RX ORDER — CLOBETASOL PROPIONATE 0.5 MG/ML
0.05 SOLUTION TOPICAL
Qty: 1 | Refills: 1 | Status: DISCONTINUED | COMMUNITY
Start: 2021-11-08 | End: 2024-01-15

## 2024-01-15 RX ORDER — MONTELUKAST 10 MG/1
10 TABLET, FILM COATED ORAL
Qty: 90 | Refills: 1 | Status: DISCONTINUED | COMMUNITY
Start: 2023-04-25 | End: 2024-01-15

## 2024-01-16 NOTE — ASSESSMENT
[FreeTextEntry1] : 78 year old female with past medical history of Osteoporosis, gait instability, multiple sclerosis, hyperlipidemia, psoriasis who presents for management of her osteoporosis  1.  Osteoporosis Patient warrants treatment for her OP given that she is at high risk for fractures.  Diet, weight bearing and muscle strengthening exercise and fall prevention were discussed. Smoking cessation and alcohol consumption (no more then an average 2 drinks/day) was discussed.  I counseled the patient regarding calcium and vitamin D intake. Calcium 1200 mg daily from diet and supplements (to be taken in divided doses as no more than 500-600 mg can be absorbed at one time) Patient was at risk for necrosis of the jaw due to oral surgery being done a week after the Prolia injection At this point given the risk of rebound fractures with stopping Prolia, her gait instability and frequent falls, her osteoporosis, and it is better to discontinue the Prolia.  She should continue to follow with oral surgeon every 3 months Metabolic evaluation for secondary causes of osteoporosis Blood was drawn in the office today Continue Prolia for now We will schedule at our office going forward

## 2024-01-16 NOTE — PHYSICAL EXAM
[Alert] : alert [Well Nourished] : well nourished [No Acute Distress] : no acute distress [Well Developed] : well developed [Normal Sclera/Conjunctiva] : normal sclera/conjunctiva [EOMI] : extra ocular movement intact [Normal Oropharynx] : the oropharynx was normal [No Proptosis] : no proptosis [Thyroid Not Enlarged] : the thyroid was not enlarged [No Thyroid Nodules] : no palpable thyroid nodules [No Respiratory Distress] : no respiratory distress [No Accessory Muscle Use] : no accessory muscle use [Clear to Auscultation] : lungs were clear to auscultation bilaterally [Normal S1, S2] : normal S1 and S2 [Normal Rate] : heart rate was normal [Regular Rhythm] : with a regular rhythm [No Edema] : no peripheral edema [Normal Bowel Sounds] : normal bowel sounds [Pedal Pulses Normal] : the pedal pulses are present [Not Tender] : non-tender [Not Distended] : not distended [Soft] : abdomen soft [Normal Anterior Cervical Nodes] : no anterior cervical lymphadenopathy [Normal Posterior Cervical Nodes] : no posterior cervical lymphadenopathy [No Spinal Tenderness] : no spinal tenderness [Spine Straight] : spine straight [No Stigmata of Cushings Syndrome] : no stigmata of Cushings Syndrome [Normal Gait] : normal gait [Normal Strength/Tone] : muscle strength and tone were normal [No Rash] : no rash [Acanthosis Nigricans] : no acanthosis nigricans [Normal Reflexes] : deep tendon reflexes were 2+ and symmetric [No Tremors] : no tremors [Oriented x3] : oriented to person, place, and time

## 2024-01-16 NOTE — HISTORY OF PRESENT ILLNESS
[FreeTextEntry1] : Ms. LÓPEZ PATE  is a 78 year old female with past medical history of Osteoporosis, gait instability, multiple sclerosis, hyperlipidemia, psoriasis who presents for management of her osteoporosis.  Patient had to have a urgent jaw surgery due to an infection around her implant.  She developed necrosis of the jaw.  The procedure was done around July 4 weekend.  Her last Prolia injection was on June 23.  Bone History: Menopause: Last menstrual period age 50s Osteoporosis diagnosed a couple years Fracture history: none Family history: No parental history of osteoporosis Treatment: Prolia (2019-  Falls: Yes Height loss: No Kidney stones: No Dental health: Regular appointments, implants,  Exercise: walks Dairy intake: yogurt, cheese Calcium supplements: None Multivitamin: None Vitamin D supplements: Vitamin D3   Osteoporosis risk factors include: Postmenopausal status,  race, falls, vitamin D deficiency, corticosteroid use

## 2024-01-22 ENCOUNTER — APPOINTMENT (OUTPATIENT)
Dept: ENDOCRINOLOGY | Facility: CLINIC | Age: 79
End: 2024-01-22
Payer: MEDICARE

## 2024-01-22 LAB
ALBUMIN SERPL ELPH-MCNC: 4.4 G/DL
ALP BLD-CCNC: 62 U/L
ALT SERPL-CCNC: 9 U/L
ANION GAP SERPL CALC-SCNC: 12 MMOL/L
AST SERPL-CCNC: 18 U/L
BILIRUB SERPL-MCNC: 0.2 MG/DL
BUN SERPL-MCNC: 29 MG/DL
CALCIUM SERPL-MCNC: 10.1 MG/DL
CALCIUM SERPL-MCNC: 10.1 MG/DL
CELIACPAN: NORMAL
CHLORIDE SERPL-SCNC: 105 MMOL/L
CO2 SERPL-SCNC: 23 MMOL/L
COLLAGEN CTX SERPL-MCNC: 345 PG/ML
COLLAGEN NTX UR-SCNC: 227 NMOL BCE
COLLAGEN NTX/CREAT UR-SRTO: 39
CREAT SERPL-MCNC: 0.81 MG/DL
CREAT UR-MCNC: 66.1 MG/DL
EGFR: 74 ML/MIN/1.73M2
GLUCOSE SERPL-MCNC: 134 MG/DL
INTERPRETIVE GUIDE:: NORMAL
PARATHYROID HORMONE INTACT: 30 PG/ML
POTASSIUM SERPL-SCNC: 4.1 MMOL/L
PROT SERPL-MCNC: 6.8 G/DL
SODIUM SERPL-SCNC: 140 MMOL/L

## 2024-01-22 PROCEDURE — 96372 THER/PROPH/DIAG INJ SC/IM: CPT

## 2024-01-22 RX ORDER — DENOSUMAB 60 MG/ML
60 INJECTION SUBCUTANEOUS
Qty: 1 | Refills: 0 | Status: COMPLETED | OUTPATIENT
Start: 2024-01-22

## 2024-01-22 RX ADMIN — DENOSUMAB 60 MG/ML: 60 INJECTION SUBCUTANEOUS at 00:00

## 2024-02-07 ENCOUNTER — RX RENEWAL (OUTPATIENT)
Age: 79
End: 2024-02-07

## 2024-02-07 RX ORDER — METFORMIN ER 500 MG 500 MG/1
500 TABLET ORAL
Qty: 180 | Refills: 1 | Status: ACTIVE | COMMUNITY
Start: 2022-02-03 | End: 1900-01-01

## 2024-03-19 ENCOUNTER — APPOINTMENT (OUTPATIENT)
Dept: ENDOCRINOLOGY | Facility: CLINIC | Age: 79
End: 2024-03-19

## 2024-03-22 ENCOUNTER — APPOINTMENT (OUTPATIENT)
Dept: INTERNAL MEDICINE | Facility: CLINIC | Age: 79
End: 2024-03-22
Payer: MEDICARE

## 2024-03-22 VITALS
WEIGHT: 150 LBS | BODY MASS INDEX: 24.11 KG/M2 | HEART RATE: 94 BPM | SYSTOLIC BLOOD PRESSURE: 110 MMHG | TEMPERATURE: 98.5 F | RESPIRATION RATE: 16 BRPM | HEIGHT: 66 IN | DIASTOLIC BLOOD PRESSURE: 78 MMHG | OXYGEN SATURATION: 96 %

## 2024-03-22 DIAGNOSIS — Z13.31 ENCOUNTER FOR SCREENING FOR DEPRESSION: ICD-10-CM

## 2024-03-22 DIAGNOSIS — Z79.899 OTHER LONG TERM (CURRENT) DRUG THERAPY: ICD-10-CM

## 2024-03-22 DIAGNOSIS — E53.8 DEFICIENCY OF OTHER SPECIFIED B GROUP VITAMINS: ICD-10-CM

## 2024-03-22 DIAGNOSIS — M19.012 PRIMARY OSTEOARTHRITIS, LEFT SHOULDER: ICD-10-CM

## 2024-03-22 DIAGNOSIS — M67.912 UNSPECIFIED DISORDER OF SYNOVIUM AND TENDON, LEFT SHOULDER: ICD-10-CM

## 2024-03-22 DIAGNOSIS — E55.9 VITAMIN D DEFICIENCY, UNSPECIFIED: ICD-10-CM

## 2024-03-22 DIAGNOSIS — M25.519 PAIN IN UNSPECIFIED SHOULDER: ICD-10-CM

## 2024-03-22 DIAGNOSIS — R73.9 HYPERGLYCEMIA, UNSPECIFIED: ICD-10-CM

## 2024-03-22 DIAGNOSIS — E78.5 HYPERLIPIDEMIA, UNSPECIFIED: ICD-10-CM

## 2024-03-22 PROCEDURE — 36415 COLL VENOUS BLD VENIPUNCTURE: CPT

## 2024-03-22 PROCEDURE — 99214 OFFICE O/P EST MOD 30 MIN: CPT

## 2024-03-22 PROCEDURE — G0444 DEPRESSION SCREEN ANNUAL: CPT | Mod: 59

## 2024-03-22 PROCEDURE — G2211 COMPLEX E/M VISIT ADD ON: CPT

## 2024-03-22 RX ORDER — TROSPIUM CHLORIDE 60 MG/1
60 CAPSULE, EXTENDED RELEASE ORAL
Qty: 90 | Refills: 1 | Status: ACTIVE | COMMUNITY
Start: 1900-01-01 | End: 1900-01-01

## 2024-03-22 RX ORDER — METRONIDAZOLE 7.5 MG/G
0.75 CREAM TOPICAL TWICE DAILY
Qty: 1 | Refills: 1 | Status: DISCONTINUED | COMMUNITY
Start: 2022-03-31 | End: 2024-03-22

## 2024-03-22 RX ORDER — METHENAMINE HIPPURATE 1 G/1
1 TABLET ORAL DAILY
Qty: 90 | Refills: 1 | Status: ACTIVE | COMMUNITY
Start: 1900-01-01 | End: 1900-01-01

## 2024-03-24 PROBLEM — E78.5 HYPERLIPIDEMIA: Status: ACTIVE | Noted: 2021-06-18

## 2024-03-24 PROBLEM — Z79.899 MEDICATION MANAGEMENT: Status: ACTIVE | Noted: 2021-06-18

## 2024-03-24 PROBLEM — E53.8 VITAMIN B 12 DEFICIENCY: Status: ACTIVE | Noted: 2021-11-17

## 2024-03-24 PROBLEM — E55.9 VITAMIN D INSUFFICIENCY: Status: ACTIVE | Noted: 2021-06-18

## 2024-03-24 PROBLEM — M67.912 DISORDER OF LEFT ROTATOR CUFF: Status: ACTIVE | Noted: 2023-10-12

## 2024-03-24 PROBLEM — M19.012 PRIMARY OSTEOARTHRITIS OF LEFT SHOULDER: Status: ACTIVE | Noted: 2023-07-20

## 2024-03-24 PROBLEM — R73.9 HYPERGLYCEMIA: Status: ACTIVE | Noted: 2021-06-18

## 2024-03-24 PROBLEM — M25.519 SHOULDER PAIN: Status: ACTIVE | Noted: 2022-02-01

## 2024-03-24 PROBLEM — Z13.31 DEPRESSION SCREENING: Status: ACTIVE | Noted: 2024-03-24

## 2024-03-24 LAB
25(OH)D3 SERPL-MCNC: 35.4 NG/ML
ALBUMIN SERPL ELPH-MCNC: 3.9 G/DL
ALP BLD-CCNC: 54 U/L
ALT SERPL-CCNC: 10 U/L
ANION GAP SERPL CALC-SCNC: 13 MMOL/L
AST SERPL-CCNC: 13 U/L
BILIRUB SERPL-MCNC: 0.3 MG/DL
BUN SERPL-MCNC: 16 MG/DL
CALCIUM SERPL-MCNC: 9.2 MG/DL
CHLORIDE SERPL-SCNC: 104 MMOL/L
CHOLEST SERPL-MCNC: 223 MG/DL
CO2 SERPL-SCNC: 24 MMOL/L
CREAT SERPL-MCNC: 0.7 MG/DL
EGFR: 88 ML/MIN/1.73M2
ESTIMATED AVERAGE GLUCOSE: 114 MG/DL
GLUCOSE SERPL-MCNC: 105 MG/DL
HBA1C MFR BLD HPLC: 5.6 %
HDLC SERPL-MCNC: 76 MG/DL
LDLC SERPL CALC-MCNC: 129 MG/DL
NONHDLC SERPL-MCNC: 148 MG/DL
POTASSIUM SERPL-SCNC: 4.5 MMOL/L
PROT SERPL-MCNC: 6.6 G/DL
SODIUM SERPL-SCNC: 141 MMOL/L
TRIGL SERPL-MCNC: 109 MG/DL
VIT B12 SERPL-MCNC: 1168 PG/ML

## 2024-03-24 NOTE — HISTORY OF PRESENT ILLNESS
[FreeTextEntry1] : fasting blood work and general follow-up  [de-identified] : Patient is a 78-year-old female with a past medical history as below who presents for fasting blood work and general follow-up. Patient states she is taking all medications as prescribed and denies any adverse reactions or side effects. She denies gastrointestinal issues on Metformin which she is taking for hyperglycemia. 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 Patient has been exercising regularly (swimming, walking).   Last labs in December of 2023 demonstrated elevated cholesterol levels at 210 mg/dL, elevated LDL levels at 123 mg/dL, and elevated hemoglobin A1C level at 5.8%. CMP done in January of 2024 noted elevated glucose levels at 134 mg/dL, elevated BUN levels at 29 mg/dL,  Patient requests Rx refills for Methenamine Hippurate, Pregabalin, and Trospium Chloride ER at this time.    Patient c/o left shoulder pain and decreased ROM. She saw orthopedist. Dr. Salguero who did not recommend surgical intervention at this time. She has received a hydrocortisone injection for pain management.

## 2024-03-24 NOTE — HEALTH RISK ASSESSMENT
[1 or 2 (0 pts)] : 1 or 2 (0 points) [Never (0 pts)] : Never (0 points) [No] : In the past 12 months have you used drugs other than those required for medical reasons? No [0] : 2) Feeling down, depressed, or hopeless: Not at all (0) [PHQ-2 Negative - No further assessment needed] : PHQ-2 Negative - No further assessment needed [Never] : Never [[id number]] : 0 [Audit-CScore] : 0

## 2024-03-24 NOTE — PHYSICAL EXAM
[No Acute Distress] : no acute distress [Well Nourished] : well nourished [Well-Appearing] : well-appearing [Well Developed] : well developed [Normal Sclera/Conjunctiva] : normal sclera/conjunctiva [EOMI] : extraocular movements intact [PERRL] : pupils equal round and reactive to light [Normal Outer Ear/Nose] : the outer ears and nose were normal in appearance [Normal Oropharynx] : the oropharynx was normal [No Lymphadenopathy] : no lymphadenopathy [No JVD] : no jugular venous distention [Supple] : supple [Thyroid Normal, No Nodules] : the thyroid was normal and there were no nodules present [No Respiratory Distress] : no respiratory distress  [Clear to Auscultation] : lungs were clear to auscultation bilaterally [No Accessory Muscle Use] : no accessory muscle use [Normal Rate] : normal rate  [Regular Rhythm] : with a regular rhythm [No Murmur] : no murmur heard [Normal S1, S2] : normal S1 and S2 [No Abdominal Bruit] : a ~M bruit was not heard ~T in the abdomen [No Carotid Bruits] : no carotid bruits [No Varicosities] : no varicosities [Pedal Pulses Present] : the pedal pulses are present [No Edema] : there was no peripheral edema [No Palpable Aorta] : no palpable aorta [No Extremity Clubbing/Cyanosis] : no extremity clubbing/cyanosis [Soft] : abdomen soft [Non Tender] : non-tender [Non-distended] : non-distended [No Masses] : no abdominal mass palpated [No HSM] : no HSM [Normal Bowel Sounds] : normal bowel sounds [Normal Anterior Cervical Nodes] : no anterior cervical lymphadenopathy [Normal Posterior Cervical Nodes] : no posterior cervical lymphadenopathy [No CVA Tenderness] : no CVA  tenderness [No Spinal Tenderness] : no spinal tenderness [No Joint Swelling] : no joint swelling [Grossly Normal Strength/Tone] : grossly normal strength/tone [No Rash] : no rash [Coordination Grossly Intact] : coordination grossly intact [No Focal Deficits] : no focal deficits [Deep Tendon Reflexes (DTR)] : deep tendon reflexes were 2+ and symmetric [Normal Gait] : normal gait [Normal Affect] : the affect was normal [Normal Insight/Judgement] : insight and judgment were intact [Normal] : affect was normal and insight and judgment were intact [Normal Voice/Communication] : normal voice/communication [Normal TMs] : both tympanic membranes were normal [Normal Nasal Mucosa] : the nasal mucosa was normal [Normal Supraclavicular Nodes] : no supraclavicular lymphadenopathy [Kyphosis] : kyphosis [Acne] : no acne [Scoliosis] : no scoliosis [Memory Grossly Normal] : memory grossly normal [Speech Grossly Normal] : speech grossly normal [Alert and Oriented x3] : oriented to person, place, and time [Normal Mood] : the mood was normal

## 2024-03-24 NOTE — PLAN
[FreeTextEntry1] : Endocrinology/Musculoskeletal osteoporosis - continue Vitamin D-3 1000 IU p.o.q.d. with a meal as directed; continue weight-bearing exercise - continue to follow up with endocrinology  Musculoskeletal left shoulder pain, rotator cuff tear-c/w Pregabalin 75mg (Lyrica) up to 3x a day p.o.q.d. as directed, Rx filled,  reviewed, Reference #: [id number]; s/p cortisone injection by physiatrist, Dr. Reynolds gait instability - previously advised caution when ambulating Neurology history of MS - continue exercises recommended by neurologist, Dr. Lee ENT/Immunology seasonal allergies - continue Montelukast Sodium (Singulair) 10mg p.o.q.d. as directed; continue Zyrtec p.o. as directed Cardiology hyperlipidemia - continue low cholesterol/low fat diet, check FLP Endocrinology hyperglycemia (prediabetes) - continue Metformin HCl ER 500mg BID p.o.q.d. as directed - continue low carbohydrate/low sugar diet - check hemoglobin A1C history of Vitamin D insufficiency - continue Vitamin D-3 1000 IU p.o.q.d. with a meal as directed- check Vitamin D level Vitamin B12 deficiency - continue OTC Vitamin B12 1000mcg p.o.q.d. as directed - check Serum Vitamin B12 level Psychiatry depression - continue Venlafaxine HCl 25mg TID p.o.q.d. as directed Urology chronic UTIs - continue Methenamine Hippurate 1 GM p.o.q.d. as directed, Rx filled; continue OTC Cranberry 405mg BID p.o.q.d. incontinence/overactive bladder - continue Trospium Chloride ER 60mg p.o.q.d. as directed, Rx filled  Continue to follow up with urologist, Dr. Epps.  check hemoglobin A1C, CMP, FLP, Serum Vitamin B12, and Vitamin D

## 2024-03-24 NOTE — ADDENDUM
[FreeTextEntry1] : I, Angelicasantana Chowdhurymonique, acted solely as scribe for Dr. Emerson Smith DO on this date 3/22/2024.  All medical record entries made by the Scribe were at my, Dr. Emerson Smith DO direction and personally dictated by me on 3/22/2024. I have reviewed the chart and agree that the record accurately reflects my personal performance of the history, physical exam, assessment and plan. I have also personally directed, reviewed and agreed with the chart.

## 2024-05-21 ENCOUNTER — RX RENEWAL (OUTPATIENT)
Age: 79
End: 2024-05-21

## 2024-05-21 RX ORDER — VENLAFAXINE 25 MG/1
25 TABLET ORAL
Qty: 270 | Refills: 1 | Status: ACTIVE | COMMUNITY
Start: 2020-04-15 | End: 1900-01-01

## 2024-06-03 ENCOUNTER — APPOINTMENT (OUTPATIENT)
Dept: ENDOCRINOLOGY | Facility: CLINIC | Age: 79
End: 2024-06-03
Payer: MEDICARE

## 2024-06-03 VITALS
OXYGEN SATURATION: 93 % | HEART RATE: 87 BPM | HEIGHT: 66 IN | BODY MASS INDEX: 24.11 KG/M2 | DIASTOLIC BLOOD PRESSURE: 71 MMHG | SYSTOLIC BLOOD PRESSURE: 110 MMHG | WEIGHT: 150 LBS

## 2024-06-03 DIAGNOSIS — M81.0 AGE-RELATED OSTEOPOROSIS W/OUT CURRENT PATHOLOGICAL FRACTURE: ICD-10-CM

## 2024-06-03 PROCEDURE — G2211 COMPLEX E/M VISIT ADD ON: CPT

## 2024-06-03 PROCEDURE — 99214 OFFICE O/P EST MOD 30 MIN: CPT

## 2024-06-03 NOTE — HISTORY OF PRESENT ILLNESS
[FreeTextEntry1] : Ms. LÓPEZ PATE  is a 79 year old female with past medical history of Osteoporosis, gait instability, multiple sclerosis, hyperlipidemia, psoriasis who presents for management of her osteoporosis.   Bone History: Menopause: Last menstrual period age 50s Osteoporosis diagnosed a couple years Fracture history: none Family history: No parental history of osteoporosis Treatment: Prolia (2019-  Falls: Yes Height loss: No Kidney stones: No Dental health: Regular appointments, implants,  Exercise: walks Dairy intake: yogurt, cheese Calcium supplements: None Multivitamin: None Vitamin D supplements: Vitamin D3   Osteoporosis risk factors include: Postmenopausal status,  race, falls, vitamin D deficiency, corticosteroid use

## 2024-06-03 NOTE — ASSESSMENT
[FreeTextEntry1] : 79 year old female with past medical history of Osteoporosis, gait instability, multiple sclerosis, hyperlipidemia, psoriasis who presents for management of her osteoporosis  1.  Osteoporosis Patient warrants treatment for her OP given that she is at high risk for fractures.  Diet, weight bearing and muscle strengthening exercise and fall prevention were discussed. Smoking cessation and alcohol consumption (no more then an average 2 drinks/day) was discussed.  I counseled the patient regarding calcium and vitamin D intake. Calcium 1200 mg daily from diet and supplements (to be taken in divided doses as no more than 500-600 mg can be absorbed at one time) Patient was at risk for necrosis of the jaw due to oral surgery being done a week after the Prolia injection At this point given the risk of rebound fractures with stopping Prolia, her gait instability and frequent falls, her osteoporosis, and it is better to discontinue the Prolia.  She should continue to follow with oral surgeon every 3 months Continue Prolia

## 2024-06-09 ENCOUNTER — RX RENEWAL (OUTPATIENT)
Age: 79
End: 2024-06-09

## 2024-06-09 RX ORDER — PREGABALIN 75 MG/1
75 CAPSULE ORAL
Qty: 90 | Refills: 0 | Status: ACTIVE | COMMUNITY
Start: 2020-05-13 | End: 1900-01-01

## 2024-06-17 ENCOUNTER — RX RENEWAL (OUTPATIENT)
Age: 79
End: 2024-06-17

## 2024-06-17 RX ORDER — EZETIMIBE 10 MG/1
10 TABLET ORAL
Qty: 90 | Refills: 1 | Status: ACTIVE | COMMUNITY
Start: 2024-03-24 | End: 1900-01-01

## 2024-07-15 ENCOUNTER — APPOINTMENT (OUTPATIENT)
Dept: ENDOCRINOLOGY | Facility: CLINIC | Age: 79
End: 2024-07-15

## 2024-07-22 ENCOUNTER — APPOINTMENT (OUTPATIENT)
Dept: ENDOCRINOLOGY | Facility: CLINIC | Age: 79
End: 2024-07-22

## 2024-07-24 ENCOUNTER — APPOINTMENT (OUTPATIENT)
Dept: ENDOCRINOLOGY | Facility: CLINIC | Age: 79
End: 2024-07-24
Payer: MEDICARE

## 2024-07-24 DIAGNOSIS — M81.0 AGE-RELATED OSTEOPOROSIS W/OUT CURRENT PATHOLOGICAL FRACTURE: ICD-10-CM

## 2024-07-24 PROCEDURE — 96372 THER/PROPH/DIAG INJ SC/IM: CPT

## 2024-07-24 RX ADMIN — DENOSUMAB 60 MG/ML: 60 INJECTION SUBCUTANEOUS at 00:00

## 2024-07-25 RX ORDER — METHYLPREDNISOLONE 4 MG/1
4 TABLET ORAL
Qty: 1 | Refills: 0 | Status: ACTIVE | COMMUNITY
Start: 2024-07-25 | End: 1900-01-01

## 2024-07-25 RX ORDER — OXYCODONE AND ACETAMINOPHEN 10; 325 MG/1; MG/1
10-325 TABLET ORAL
Qty: 120 | Refills: 0 | Status: ACTIVE | COMMUNITY
Start: 2024-07-25 | End: 1900-01-01

## 2024-07-25 RX ORDER — DENOSUMAB 60 MG/ML
60 INJECTION SUBCUTANEOUS
Qty: 1 | Refills: 0 | Status: COMPLETED | OUTPATIENT
Start: 2024-07-24

## 2024-08-02 LAB
25(OH)D3 SERPL-MCNC: 36.9 NG/ML
ALBUMIN SERPL ELPH-MCNC: 4.2 G/DL
ALP BLD-CCNC: 63 U/L
ALT SERPL-CCNC: 12 U/L
ANION GAP SERPL CALC-SCNC: 9 MMOL/L
AST SERPL-CCNC: 14 U/L
BILIRUB SERPL-MCNC: 0.2 MG/DL
BUN SERPL-MCNC: 25 MG/DL
CALCIUM SERPL-MCNC: 10.3 MG/DL
CHLORIDE SERPL-SCNC: 105 MMOL/L
CO2 SERPL-SCNC: 28 MMOL/L
COLLAGEN CTX SERPL-MCNC: 127 PG/ML
COLLAGEN NTX UR-SCNC: 49 NMOL BCE
COLLAGEN NTX/CREAT UR-SRTO: 13
CREAT SERPL-MCNC: 0.84 MG/DL
CREAT UR-MCNC: 41.4 MG/DL
EGFR: 70 ML/MIN/1.73M2
GLUCOSE SERPL-MCNC: 98 MG/DL
INTERPRETIVE GUIDE:: NORMAL
POTASSIUM SERPL-SCNC: 5.4 MMOL/L
PROT SERPL-MCNC: 7.2 G/DL
SODIUM SERPL-SCNC: 142 MMOL/L

## 2024-08-17 ENCOUNTER — APPOINTMENT (OUTPATIENT)
Dept: RADIOLOGY | Facility: CLINIC | Age: 79
End: 2024-08-17
Payer: MEDICARE

## 2024-08-17 ENCOUNTER — APPOINTMENT (OUTPATIENT)
Dept: ULTRASOUND IMAGING | Facility: CLINIC | Age: 79
End: 2024-08-17
Payer: MEDICARE

## 2024-08-17 PROCEDURE — 93971 EXTREMITY STUDY: CPT | Mod: 26,LT

## 2024-08-17 PROCEDURE — 73600 X-RAY EXAM OF ANKLE: CPT | Mod: 26,LT

## 2024-09-13 ENCOUNTER — APPOINTMENT (OUTPATIENT)
Dept: INTERNAL MEDICINE | Facility: CLINIC | Age: 79
End: 2024-09-13
Payer: MEDICARE

## 2024-09-13 VITALS
HEIGHT: 66 IN | BODY MASS INDEX: 24.75 KG/M2 | SYSTOLIC BLOOD PRESSURE: 116 MMHG | HEART RATE: 85 BPM | DIASTOLIC BLOOD PRESSURE: 70 MMHG | WEIGHT: 154 LBS | OXYGEN SATURATION: 95 % | TEMPERATURE: 98.1 F

## 2024-09-13 DIAGNOSIS — R73.9 HYPERGLYCEMIA, UNSPECIFIED: ICD-10-CM

## 2024-09-13 DIAGNOSIS — M67.912 UNSPECIFIED DISORDER OF SYNOVIUM AND TENDON, LEFT SHOULDER: ICD-10-CM

## 2024-09-13 DIAGNOSIS — E78.5 HYPERLIPIDEMIA, UNSPECIFIED: ICD-10-CM

## 2024-09-13 DIAGNOSIS — R26.81 UNSTEADINESS ON FEET: ICD-10-CM

## 2024-09-13 PROCEDURE — 36415 COLL VENOUS BLD VENIPUNCTURE: CPT

## 2024-09-13 PROCEDURE — G2211 COMPLEX E/M VISIT ADD ON: CPT

## 2024-09-13 PROCEDURE — 99214 OFFICE O/P EST MOD 30 MIN: CPT

## 2024-09-14 NOTE — PLAN
[address]  [address] [address]  Dept: [phone]  Loc: [address] ENCOUNTER        This patient was not seen or evaluated by the attending physician. I evaluated this patient, the attending physician was available for consultation. CHIEF COMPLAINT    Chief Complaint   Patient presents with    Back Pain     Pt states back pain started 3 weeks ago and pt states it keeps getting worse. Pt has left leg radiation, pt has spasms, pt denies loss of bowel and bladder function, pt has tingling on left leg. HPI    Kiera Son is a 34 y.o. female who presents with back pain, localized in the left lumbar region of the back, rating down her buttocks to the left lower extremity. The onset was 3 weeks ago. The duration has been intermittent since the onset. The quality of the pain is sharp. The pain worsens with movement. The context is no specific injury or trauma event, has progressively worsened. Patient denies history of malignancy, IV drug use, or recent surgery. No history of uncontrolled diabetes, HIV or immunosuppressant therapy. Denies bowel or bladder dysfunction or saddle anesthesia. REVIEW OF SYSTEMS    General: No fevers, chills or night sweats, No weight loss  GI: No abdominal pain or vomiting  : No dysuria or hematuria  Musculoskeletal: see HPI, No unrelenting pain or night pain  Neurologic: No bowel or bladder incontinence, No saddle anesthesia, No leg weakness  All other systems reviewed and are negative. PAST MEDICAL & SURGICAL HISTORY    History reviewed. No pertinent past medical history. History reviewed. No pertinent surgical history.     CURRENT MEDICATIONS  (may include discharge medications prescribed in the ED)      ALLERGIES    No Known Allergies    SOCIAL HISTORY    Social History     Socioeconomic History    Marital status: Single     Spouse name: None Number of children: None    Years of education: None    Highest education level: None   Tobacco Use    Smoking status: Never    Smokeless tobacco: Never   Substance and Sexual Activity    Alcohol use: Never    Drug use: Yes     Types: Marijuana (Weed)       PHYSICAL EXAM    VITAL SIGNS: BP (!) 162/103   Pulse 77   Temp 97.7 °F (36.5 °C) (Oral)   Resp 22   Ht 5' 2\" (1.575 m)   Wt 237 lb 7 oz (107.7 kg)   LMP 07/22/2022 (Approximate)   SpO2 96%   BMI 43.43 kg/m²    Constitutional:  Well developed, well nourished, very tearful affect, non-toxic appearing  HENT:  Atraumatic, moist mucus membranes  Neck: No JVD, supple   Respiratory:  No respiratory distress, normal breath sounds  Cardiovascular:  regular rate, no murmurs  GI:  Soft, nontender, no pulsatile masses or bruits of the abdomen  Musculoskeletal:  No edema, no acute deformities    Back: + left lumbar paraspinous tenderness to palpation, no bony midline tenderness, no CVA tenderness. Ambulated from the lobby back to her room without any ataxia or gait abnormalities. No evidence of foot drop. No bowel or bladder dysfunction. No saddle anesthesia  Integument:  Well hydrated, no rash  Vascular: Dorsalis pedis pulses are 2+ and equal bilaterally  Neurologic:  Motor testing reveals: intact thigh adduction, knee flexion and extension, ankle dorsiflexion, toe plantarflexion, and great toe dorsiflexion bilaterally, sensation to light touch is intact in the groin and lower extremities bilaterally. Witnessed ambulation showed no ataxia, gait abnormalities or issues. RADIOLOGY  XR LUMBAR SPINE (2-3 VIEWS)   Final Result   No evidence acute fracture traumatic malalignment. Intervertebral loss of   height L5-S1             ED COURSE & MEDICAL DECISION MAKING    Please see EMR for medications administered in the ED.     Differential Diagnosis: Spinal Epidural Abscess, Vertebral Osteomyelitis, Cauda Equina Syndrome, Spinal Cord Compression, musculoskeletal pain, sciatica, ruptured/dissecting Abdominal Aortic Aneurysm, Metastases to the back, Herpes Zoster, Kidney Stone, Pyelonephritis, Fracture or dislocation, other    Patient is afebrile and nontoxic in appearance. No evidence of neurological deficit on exam.  The pain is not a ripping or tearing sensation, they have no neurovascular compromise on examination in bilateral lower extremities. No rashes or lesions appreciated. Films as read by the radiologist as above. Urinalysis does show nitrite positive and large amount of leukocytes with 4+ bacteria and 32 WBCs. Urine culture sent for culturing and sensitivities. Will be started on a course of Ceftin for UTI. Urine pregnancy negative    Due to the unremarkable history and lack of systemic complaints or atypical features, as well as the absence of neurological deficit, I have low suspicion at this time for epidural compression syndrome or infectious etiology. Patient's pain is most likely musculoskeletal spasm/pain with sciatica. I believe the patient is safe for discharge at this time. I'll prescribe symptomatic medications.     Results for orders placed or performed during the hospital encounter of 08/06/22   Urine Preg (Lab)   Result Value Ref Range    HCG(Urine) Pregnancy Test Negative Detects HCG level >20 MIU/mL   Urinalysis with Reflex to Culture    Specimen: Urine   Result Value Ref Range    Color, UA Yellow Straw/Yellow    Clarity, UA CLOUDY (A) Clear    Glucose, Ur Negative Negative mg/dL    Bilirubin Urine Negative Negative    Ketones, Urine Negative Negative mg/dL    Specific Gravity, UA 1.020 1.005 - 1.030    Blood, Urine Negative Negative    pH, UA 6.5 5.0 - 8.0    Protein, UA TRACE (A) Negative mg/dL    Urobilinogen, Urine 0.2 <2.0 E.U./dL    Nitrite, Urine POSITIVE (A) Negative    Leukocyte Esterase, Urine LARGE (A) Negative    Microscopic Examination YES     Urine Type NotGiven     Urine Reflex to Culture Yes    Microscopic Urinalysis   Result Value Ref Range    Bacteria, UA 4+ (A) None Seen /HPF    Hyaline Casts, UA 3 0 - 8 /LPF    WBC, UA 32 (H) 0 - 5 /HPF    RBC, UA 2 0 - 4 /HPF    Epithelial Cells, UA 10 (H) 0 - 5 /HPF       I estimate there is LOW risk for ABDOMINAL AORTIC ANEURYSM, CAUDA EQUINA SYNDROME, EPIDURAL MASS LESION, SPINAL STENOSIS, OR HERNIATED DISK CAUSING SEVERE STENOSIS, thus I consider the discharge disposition reasonable. Sukumar Zarco and I have discussed the diagnosis and risks, and we agree with discharging home to follow-up with their primary doctor in 2 to 3 days. We also discussed returning to the Emergency Department immediately if new or worsening symptoms occur. We have discussed the symptoms which are most concerning (e.g., saddle anesthesia, urinary or bowel incontinence or retention, changing or worsening pain) that necessitate immediate return. Patient verbalized understanding, has no further questions or concerns that are in agreement with this plan as well as the plan of discharge. Final Impression    1. Urinary tract infection without hematuria, site unspecified    2. Acute left-sided low back pain with left-sided sciatica        Blood pressure (!) 162/103, pulse 77, temperature 97.7 °F (36.5 °C), temperature source Oral, resp. rate 22, height 5' 2\" (1.575 m), weight 237 lb 7 oz (107.7 kg), last menstrual period 07/22/2022, SpO2 96 %.      PLAN  Discharge with outpatient follow-up (see EMR)      (Please note that this note was completed with a voice recognition program.  Every attempt was made to edit the dictations, but inevitably there remain words that are mis-transcribed.)            Barb Jones, [place] - CNP  08/06/22 9962 [FreeTextEntry1] : Endocrinology/Musculoskeletal osteoporosis - continue Vitamin D-3 1000 IU p.o.q.d. with a meal as directed; continue weight-bearing exercise - continue to follow up with endocrinology  Musculoskeletal left shoulder pain, rotator cuff tear-c/w Pregabalin 75mg (Lyrica) up to 3x a day p.o.q.d. as directed, Rx filled,  reviewed, s/p cortisone injection by physiatrist, Dr. Reynolds gait instability - previously advised caution when ambulating Neurology history of MS - continue exercises recommended by neurologist, Dr. Lee ENT/Immunology seasonal allergies - continue Montelukast Sodium (Singulair) 10mg p.o.q.d. as directed; continue Zyrtec p.o. as directed Cardiology hyperlipidemia - continue low cholesterol/low fat diet, check FLP abnormal EKG-has upcoming consultation with Dr. Yuen Endocrinology hyperglycemia (prediabetes) - continue Metformin HCl ER 500mg BID p.o.q.d. as directed - continue low carbohydrate/low sugar diet - check hemoglobin A1C history of Vitamin D insufficiency - continue Vitamin D-3 1000 IU p.o.q.d. with a meal as directed- check Vitamin D level Vitamin B12 deficiency - continue OTC Vitamin B12 1000mcg p.o.q.d. as directed - check Serum Vitamin B12 level Psychiatry depression - continue Venlafaxine HCl 25mg TID p.o.q.d. as directed Urology chronic UTIs - continue Methenamine Hippurate 1 GM p.o.q.d. as directed, Rx filled; continue OTC Cranberry 405mg BID p.o.q.d. incontinence/overactive bladder - continue Trospium Chloride ER 60mg p.o.q.d. as directed, Rx filled  Continue to follow up with urologist, Dr. Epps.  check hemoglobin A1C, CMP, FLP, Serum Vitamin B12, and Vitamin D

## 2024-09-14 NOTE — PHYSICAL EXAM
[No Acute Distress] : no acute distress [Well Nourished] : well nourished [Well Developed] : well developed [Well-Appearing] : well-appearing [Normal Voice/Communication] : normal voice/communication [Normal Sclera/Conjunctiva] : normal sclera/conjunctiva [PERRL] : pupils equal round and reactive to light [EOMI] : extraocular movements intact [Normal Outer Ear/Nose] : the outer ears and nose were normal in appearance [Normal Oropharynx] : the oropharynx was normal [Normal TMs] : both tympanic membranes were normal [Normal Nasal Mucosa] : the nasal mucosa was normal [No JVD] : no jugular venous distention [No Lymphadenopathy] : no lymphadenopathy [Supple] : supple [Thyroid Normal, No Nodules] : the thyroid was normal and there were no nodules present [No Respiratory Distress] : no respiratory distress  [No Accessory Muscle Use] : no accessory muscle use [Clear to Auscultation] : lungs were clear to auscultation bilaterally [Normal Rate] : normal rate  [Regular Rhythm] : with a regular rhythm [Normal S1, S2] : normal S1 and S2 [No Murmur] : no murmur heard [No Carotid Bruits] : no carotid bruits [No Abdominal Bruit] : a ~M bruit was not heard ~T in the abdomen [Pedal Pulses Present] : the pedal pulses are present [No Edema] : there was no peripheral edema [No Palpable Aorta] : no palpable aorta [Soft] : abdomen soft [Non Tender] : non-tender [Non-distended] : non-distended [No Masses] : no abdominal mass palpated [No HSM] : no HSM [Normal Bowel Sounds] : normal bowel sounds [Normal Supraclavicular Nodes] : no supraclavicular lymphadenopathy [Normal Posterior Cervical Nodes] : no posterior cervical lymphadenopathy [Normal Anterior Cervical Nodes] : no anterior cervical lymphadenopathy [No CVA Tenderness] : no CVA  tenderness [No Spinal Tenderness] : no spinal tenderness [Kyphosis] : kyphosis [Scoliosis] : no scoliosis [No Joint Swelling] : no joint swelling [Grossly Normal Strength/Tone] : grossly normal strength/tone [No Rash] : no rash [Acne] : no acne [Coordination Grossly Intact] : coordination grossly intact [No Focal Deficits] : no focal deficits [Normal Gait] : normal gait [Deep Tendon Reflexes (DTR)] : deep tendon reflexes were 2+ and symmetric [Speech Grossly Normal] : speech grossly normal [Memory Grossly Normal] : memory grossly normal [Normal Affect] : the affect was normal [Alert and Oriented x3] : oriented to person, place, and time [Normal Mood] : the mood was normal [Normal Insight/Judgement] : insight and judgment were intact [Normal] : affect was normal and insight and judgment were intact

## 2024-09-14 NOTE — HEALTH RISK ASSESSMENT
[1 or 2 (0 pts)] : 1 or 2 (0 points) [Never (0 pts)] : Never (0 points) [No] : In the past 12 months have you used drugs other than those required for medical reasons? No [0] : 2) Feeling down, depressed, or hopeless: Not at all (0) [PHQ-2 Negative - No further assessment needed] : PHQ-2 Negative - No further assessment needed [Audit-CScore] : 0 [[id number]] : 0 [Never] : Never

## 2024-09-14 NOTE — ASSESSMENT
[FreeTextEntry1] : Patient is a 79-year-old female with a past medical history as above who presents for fasting blood work and general follow-up.

## 2024-09-14 NOTE — HISTORY OF PRESENT ILLNESS
[FreeTextEntry1] : fasting blood work and general follow-up  [de-identified] : Patient is a 79 year-old female with a past medical history as below who presents for fasting blood work and general follow-up. Patient states she is taking all medications as prescribed and denies any adverse reactions or side effects. She denies gastrointestinal issues on Metformin which she is taking for hyperglycemia. 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 Patient has been exercising regularly (swimming, walking).   Last labs in December of 2023 demonstrated elevated cholesterol levels at 210 mg/dL, elevated LDL levels at 123 mg/dL, and elevated hemoglobin A1C level at 5.8%. CMP done in January of 2024 noted elevated glucose levels at 134 mg/dL, elevated BUN levels at 29 mg/dL,  Patient requests Rx refills for Pregabalin, and Zolpidem  Patient c/o left shoulder pain and decreased ROM. She saw orthopedist. Dr. Salguero who did not recommend surgical intervention at this time. She has received a hydrocortisone injection for pain management.

## 2024-09-15 LAB
25(OH)D3 SERPL-MCNC: 40.8 NG/ML
ALBUMIN SERPL ELPH-MCNC: 4.4 G/DL
ALP BLD-CCNC: 64 U/L
ALT SERPL-CCNC: 13 U/L
ANION GAP SERPL CALC-SCNC: 10 MMOL/L
AST SERPL-CCNC: 15 U/L
BASOPHILS # BLD AUTO: 0.03 K/UL
BASOPHILS NFR BLD AUTO: 0.4 %
BILIRUB SERPL-MCNC: 0.2 MG/DL
BUN SERPL-MCNC: 18 MG/DL
CALCIUM SERPL-MCNC: 9.5 MG/DL
CHLORIDE SERPL-SCNC: 105 MMOL/L
CHOLEST SERPL-MCNC: 197 MG/DL
CO2 SERPL-SCNC: 27 MMOL/L
CREAT SERPL-MCNC: 0.7 MG/DL
EGFR: 87 ML/MIN/1.73M2
EOSINOPHIL # BLD AUTO: 0.16 K/UL
EOSINOPHIL NFR BLD AUTO: 2.2 %
ESTIMATED AVERAGE GLUCOSE: 126 MG/DL
GLUCOSE SERPL-MCNC: 103 MG/DL
HBA1C MFR BLD HPLC: 6 %
HCT VFR BLD CALC: 45.9 %
HDLC SERPL-MCNC: 76 MG/DL
HGB BLD-MCNC: 14.1 G/DL
IMM GRANULOCYTES NFR BLD AUTO: 0.4 %
LDLC SERPL CALC-MCNC: 101 MG/DL
LYMPHOCYTES # BLD AUTO: 1.68 K/UL
LYMPHOCYTES NFR BLD AUTO: 23.1 %
MAN DIFF?: NORMAL
MCHC RBC-ENTMCNC: 28 PG
MCHC RBC-ENTMCNC: 30.7 GM/DL
MCV RBC AUTO: 91.1 FL
MONOCYTES # BLD AUTO: 0.86 K/UL
MONOCYTES NFR BLD AUTO: 11.8 %
NEUTROPHILS # BLD AUTO: 4.51 K/UL
NEUTROPHILS NFR BLD AUTO: 62.1 %
NONHDLC SERPL-MCNC: 122 MG/DL
PLATELET # BLD AUTO: 283 K/UL
POTASSIUM SERPL-SCNC: 4.9 MMOL/L
PROT SERPL-MCNC: 7.2 G/DL
RBC # BLD: 5.04 M/UL
RBC # FLD: 14.8 %
SODIUM SERPL-SCNC: 142 MMOL/L
T3FREE SERPL-MCNC: 2.9 PG/ML
T4 FREE SERPL-MCNC: 1.2 NG/DL
THYROGLOB AB SERPL-ACNC: 16.8 IU/ML
THYROPEROXIDASE AB SERPL IA-ACNC: 10.2 IU/ML
TRIGL SERPL-MCNC: 116 MG/DL
TSH SERPL-ACNC: 3.21 UIU/ML
VIT B12 SERPL-MCNC: >2000 PG/ML
WBC # FLD AUTO: 7.27 K/UL

## 2024-09-30 ENCOUNTER — RX RENEWAL (OUTPATIENT)
Age: 79
End: 2024-09-30

## 2024-10-01 RX ORDER — OXYCODONE AND ACETAMINOPHEN 10; 325 MG/1; MG/1
10-325 TABLET ORAL
Qty: 120 | Refills: 0 | Status: ACTIVE | COMMUNITY
Start: 2024-10-01 | End: 1900-01-01

## 2024-10-21 ENCOUNTER — RX ONLY (RX ONLY)
Age: 79
End: 2024-10-21

## 2024-10-21 ENCOUNTER — OFFICE (OUTPATIENT)
Dept: URBAN - METROPOLITAN AREA CLINIC 70 | Facility: CLINIC | Age: 79
Setting detail: OPHTHALMOLOGY
End: 2024-10-21
Payer: MEDICARE

## 2024-10-21 DIAGNOSIS — H10.45: ICD-10-CM

## 2024-10-21 PROCEDURE — 92002 INTRM OPH EXAM NEW PATIENT: CPT | Performed by: OPTOMETRIST

## 2024-10-21 ASSESSMENT — VISUAL ACUITY
OS_BCVA: 20/25-2
OD_BCVA: 20/40+1

## 2024-10-21 ASSESSMENT — CONFRONTATIONAL VISUAL FIELD TEST (CVF)
OD_FINDINGS: FULL
OS_FINDINGS: FULL

## 2024-10-28 ENCOUNTER — RX ONLY (RX ONLY)
Age: 79
End: 2024-10-28

## 2024-10-28 ENCOUNTER — OFFICE (OUTPATIENT)
Dept: URBAN - METROPOLITAN AREA CLINIC 70 | Facility: CLINIC | Age: 79
Setting detail: OPHTHALMOLOGY
End: 2024-10-28
Payer: MEDICARE

## 2024-10-28 DIAGNOSIS — H10.45: ICD-10-CM

## 2024-10-28 PROCEDURE — 99213 OFFICE O/P EST LOW 20 MIN: CPT | Performed by: OPTOMETRIST

## 2024-10-28 ASSESSMENT — VISUAL ACUITY
OS_BCVA: 20/25-2
OD_BCVA: 20/40+1

## 2024-10-28 ASSESSMENT — CONFRONTATIONAL VISUAL FIELD TEST (CVF)
OD_FINDINGS: FULL
OS_FINDINGS: FULL

## 2024-10-31 ENCOUNTER — RX RENEWAL (OUTPATIENT)
Age: 79
End: 2024-10-31

## 2024-11-11 ENCOUNTER — RX RENEWAL (OUTPATIENT)
Age: 79
End: 2024-11-11

## 2024-12-12 ENCOUNTER — APPOINTMENT (OUTPATIENT)
Dept: INTERNAL MEDICINE | Facility: CLINIC | Age: 79
End: 2024-12-12
Payer: MEDICARE

## 2024-12-12 VITALS
TEMPERATURE: 97.9 F | BODY MASS INDEX: 24.11 KG/M2 | DIASTOLIC BLOOD PRESSURE: 82 MMHG | RESPIRATION RATE: 16 BRPM | HEART RATE: 91 BPM | WEIGHT: 150 LBS | SYSTOLIC BLOOD PRESSURE: 118 MMHG | HEIGHT: 66 IN | OXYGEN SATURATION: 95 %

## 2024-12-12 DIAGNOSIS — M48.00 SPINAL STENOSIS, SITE UNSPECIFIED: ICD-10-CM

## 2024-12-12 DIAGNOSIS — R26.81 UNSTEADINESS ON FEET: ICD-10-CM

## 2024-12-12 DIAGNOSIS — R73.9 HYPERGLYCEMIA, UNSPECIFIED: ICD-10-CM

## 2024-12-12 DIAGNOSIS — M15.0 PRIMARY GENERALIZED (OSTEO)ARTHRITIS: ICD-10-CM

## 2024-12-12 DIAGNOSIS — E78.5 HYPERLIPIDEMIA, UNSPECIFIED: ICD-10-CM

## 2024-12-12 DIAGNOSIS — Z79.899 OTHER LONG TERM (CURRENT) DRUG THERAPY: ICD-10-CM

## 2024-12-12 DIAGNOSIS — G35 MULTIPLE SCLEROSIS: ICD-10-CM

## 2024-12-12 DIAGNOSIS — E55.9 VITAMIN D DEFICIENCY, UNSPECIFIED: ICD-10-CM

## 2024-12-12 DIAGNOSIS — E53.8 DEFICIENCY OF OTHER SPECIFIED B GROUP VITAMINS: ICD-10-CM

## 2024-12-12 PROCEDURE — 36415 COLL VENOUS BLD VENIPUNCTURE: CPT

## 2024-12-12 PROCEDURE — 99215 OFFICE O/P EST HI 40 MIN: CPT

## 2024-12-12 PROCEDURE — G2211 COMPLEX E/M VISIT ADD ON: CPT

## 2024-12-14 ENCOUNTER — NON-APPOINTMENT (OUTPATIENT)
Age: 79
End: 2024-12-14

## 2024-12-14 LAB
25(OH)D3 SERPL-MCNC: 37 NG/ML
ALBUMIN SERPL ELPH-MCNC: 4.3 G/DL
ALP BLD-CCNC: 55 U/L
ALT SERPL-CCNC: 8 U/L
ANION GAP SERPL CALC-SCNC: 13 MMOL/L
AST SERPL-CCNC: 14 U/L
BASOPHILS # BLD AUTO: 0.03 K/UL
BASOPHILS NFR BLD AUTO: 0.4 %
BILIRUB SERPL-MCNC: 0.2 MG/DL
BUN SERPL-MCNC: 19 MG/DL
CALCIUM SERPL-MCNC: 10 MG/DL
CHLORIDE SERPL-SCNC: 104 MMOL/L
CHOLEST SERPL-MCNC: 176 MG/DL
CO2 SERPL-SCNC: 25 MMOL/L
CREAT SERPL-MCNC: 0.76 MG/DL
EGFR: 79 ML/MIN/1.73M2
EOSINOPHIL # BLD AUTO: 0.17 K/UL
EOSINOPHIL NFR BLD AUTO: 2.4 %
ESTIMATED AVERAGE GLUCOSE: 126 MG/DL
GLUCOSE SERPL-MCNC: 115 MG/DL
HBA1C MFR BLD HPLC: 6 %
HCT VFR BLD CALC: 43 %
HDLC SERPL-MCNC: 70 MG/DL
HGB BLD-MCNC: 13.4 G/DL
IMM GRANULOCYTES NFR BLD AUTO: 1.4 %
LDLC SERPL CALC-MCNC: 87 MG/DL
LYMPHOCYTES # BLD AUTO: 1.4 K/UL
LYMPHOCYTES NFR BLD AUTO: 19.5 %
MAN DIFF?: NORMAL
MCHC RBC-ENTMCNC: 28.7 PG
MCHC RBC-ENTMCNC: 31.2 G/DL
MCV RBC AUTO: 92.1 FL
MONOCYTES # BLD AUTO: 0.82 K/UL
MONOCYTES NFR BLD AUTO: 11.4 %
NEUTROPHILS # BLD AUTO: 4.65 K/UL
NEUTROPHILS NFR BLD AUTO: 64.9 %
NONHDLC SERPL-MCNC: 106 MG/DL
PLATELET # BLD AUTO: 245 K/UL
POTASSIUM SERPL-SCNC: 5.6 MMOL/L
PROT SERPL-MCNC: 7.1 G/DL
RBC # BLD: 4.67 M/UL
RBC # FLD: 14.7 %
SODIUM SERPL-SCNC: 142 MMOL/L
T3FREE SERPL-MCNC: 2.55 PG/ML
T4 FREE SERPL-MCNC: 1.1 NG/DL
THYROGLOB AB SERPL-ACNC: 15.7 IU/ML
THYROPEROXIDASE AB SERPL IA-ACNC: <9 IU/ML
TRIGL SERPL-MCNC: 106 MG/DL
TSH SERPL-ACNC: 2.56 UIU/ML
VIT B12 SERPL-MCNC: 1622 PG/ML
WBC # FLD AUTO: 7.17 K/UL

## 2024-12-14 RX ORDER — OXYCODONE AND ACETAMINOPHEN 10; 325 MG/1; MG/1
10-325 TABLET ORAL
Qty: 120 | Refills: 0 | Status: ACTIVE | COMMUNITY
Start: 2024-12-14 | End: 1900-01-01

## 2024-12-14 RX ORDER — MELOXICAM 7.5 MG/1
7.5 TABLET ORAL
Qty: 30 | Refills: 0 | Status: ACTIVE | COMMUNITY
Start: 2024-12-14 | End: 1900-01-01

## 2025-01-06 ENCOUNTER — APPOINTMENT (OUTPATIENT)
Dept: ENDOCRINOLOGY | Facility: CLINIC | Age: 80
End: 2025-01-06
Payer: MEDICARE

## 2025-01-06 DIAGNOSIS — M81.0 AGE-RELATED OSTEOPOROSIS W/OUT CURRENT PATHOLOGICAL FRACTURE: ICD-10-CM

## 2025-01-06 PROCEDURE — 96372 THER/PROPH/DIAG INJ SC/IM: CPT

## 2025-01-06 RX ORDER — DENOSUMAB 60 MG/ML
60 INJECTION SUBCUTANEOUS
Qty: 1 | Refills: 0 | Status: COMPLETED | OUTPATIENT
Start: 2025-01-06

## 2025-01-06 RX ADMIN — DENOSUMAB 60 MG/ML: 60 INJECTION SUBCUTANEOUS at 00:00

## 2025-01-29 ENCOUNTER — RX RENEWAL (OUTPATIENT)
Age: 80
End: 2025-01-29

## 2025-02-26 ENCOUNTER — RX RENEWAL (OUTPATIENT)
Age: 80
End: 2025-02-26

## 2025-02-27 ENCOUNTER — APPOINTMENT (OUTPATIENT)
Dept: DERMATOLOGY | Facility: CLINIC | Age: 80
End: 2025-02-27
Payer: MEDICARE

## 2025-02-27 DIAGNOSIS — L40.9 PSORIASIS, UNSPECIFIED: ICD-10-CM

## 2025-02-27 DIAGNOSIS — L71.1 RHINOPHYMA: ICD-10-CM

## 2025-02-27 PROCEDURE — 99214 OFFICE O/P EST MOD 30 MIN: CPT

## 2025-02-27 RX ORDER — CLOBETASOL PROPIONATE 0.5 MG/ML
0.05 SOLUTION TOPICAL
Qty: 50 | Refills: 3 | Status: ACTIVE | COMMUNITY
Start: 2025-02-27 | End: 1900-01-01

## 2025-05-13 ENCOUNTER — APPOINTMENT (OUTPATIENT)
Dept: DERMATOLOGY | Facility: CLINIC | Age: 80
End: 2025-05-13

## 2025-06-03 ENCOUNTER — APPOINTMENT (OUTPATIENT)
Dept: ENDOCRINOLOGY | Facility: CLINIC | Age: 80
End: 2025-06-03
Payer: MEDICARE

## 2025-06-03 VITALS
HEART RATE: 91 BPM | DIASTOLIC BLOOD PRESSURE: 70 MMHG | OXYGEN SATURATION: 94 % | HEIGHT: 66 IN | WEIGHT: 151 LBS | BODY MASS INDEX: 24.27 KG/M2 | SYSTOLIC BLOOD PRESSURE: 110 MMHG

## 2025-06-03 DIAGNOSIS — M81.0 AGE-RELATED OSTEOPOROSIS W/OUT CURRENT PATHOLOGICAL FRACTURE: ICD-10-CM

## 2025-06-03 PROCEDURE — 99213 OFFICE O/P EST LOW 20 MIN: CPT

## 2025-06-03 PROCEDURE — G2211 COMPLEX E/M VISIT ADD ON: CPT

## 2025-06-13 LAB
25(OH)D3 SERPL-MCNC: 41.6 NG/ML
ALBUMIN SERPL ELPH-MCNC: 4.1 G/DL
ALP BLD-CCNC: 54 U/L
ALT SERPL-CCNC: 16 U/L
ANION GAP SERPL CALC-SCNC: 14 MMOL/L
AST SERPL-CCNC: 19 U/L
BILIRUB SERPL-MCNC: 0.3 MG/DL
BUN SERPL-MCNC: 16 MG/DL
CALCIUM SERPL-MCNC: 9.6 MG/DL
CHLORIDE SERPL-SCNC: 103 MMOL/L
CO2 SERPL-SCNC: 25 MMOL/L
CREAT SERPL-MCNC: 0.79 MG/DL
EGFRCR SERPLBLD CKD-EPI 2021: 75 ML/MIN/1.73M2
GLUCOSE SERPL-MCNC: 127 MG/DL
POTASSIUM SERPL-SCNC: 4.9 MMOL/L
PROT SERPL-MCNC: 6.8 G/DL
SODIUM SERPL-SCNC: 142 MMOL/L

## 2025-06-17 ENCOUNTER — APPOINTMENT (OUTPATIENT)
Dept: DERMATOLOGY | Facility: CLINIC | Age: 80
End: 2025-06-17
Payer: MEDICARE

## 2025-06-17 PROBLEM — L82.1 SEBORRHEIC KERATOSES: Status: ACTIVE | Noted: 2025-06-17

## 2025-06-17 PROBLEM — D22.9 MULTIPLE NEVI: Status: ACTIVE | Noted: 2025-06-17

## 2025-06-17 PROCEDURE — 99214 OFFICE O/P EST MOD 30 MIN: CPT

## 2025-06-25 RX ORDER — OXYCODONE AND ACETAMINOPHEN 10; 325 MG/1; MG/1
10-325 TABLET ORAL
Qty: 120 | Refills: 0 | Status: ACTIVE | COMMUNITY
Start: 2025-06-25 | End: 1900-01-01

## 2025-06-25 RX ORDER — METHYLPREDNISOLONE 4 MG/1
4 TABLET ORAL
Qty: 1 | Refills: 0 | Status: ACTIVE | COMMUNITY
Start: 2025-06-25 | End: 1900-01-01

## 2025-07-08 ENCOUNTER — APPOINTMENT (OUTPATIENT)
Dept: ENDOCRINOLOGY | Facility: CLINIC | Age: 80
End: 2025-07-08

## 2025-07-09 ENCOUNTER — APPOINTMENT (OUTPATIENT)
Dept: ENDOCRINOLOGY | Facility: CLINIC | Age: 80
End: 2025-07-09
Payer: MEDICARE

## 2025-07-09 PROCEDURE — 96372 THER/PROPH/DIAG INJ SC/IM: CPT

## 2025-07-09 RX ADMIN — DENOSUMAB 60 MG/ML: 60 INJECTION SUBCUTANEOUS at 00:00

## 2025-07-10 RX ORDER — DENOSUMAB 60 MG/ML
60 INJECTION SUBCUTANEOUS
Qty: 1 | Refills: 0 | Status: COMPLETED | OUTPATIENT
Start: 2025-07-09

## 2025-08-31 PROBLEM — J06.9 URI, ACUTE: Status: ACTIVE | Noted: 2025-08-31 | Resolved: 2025-09-30
